# Patient Record
Sex: MALE | Race: OTHER | Employment: UNEMPLOYED | URBAN - METROPOLITAN AREA
[De-identification: names, ages, dates, MRNs, and addresses within clinical notes are randomized per-mention and may not be internally consistent; named-entity substitution may affect disease eponyms.]

---

## 2018-05-30 ENCOUNTER — HOSPITAL ENCOUNTER (EMERGENCY)
Age: 38
Discharge: HOME | End: 2018-05-30
Payer: COMMERCIAL

## 2018-05-30 VITALS
RESPIRATION RATE: 18 BRPM | WEIGHT: 230 LBS | HEIGHT: 77 IN | HEART RATE: 70 BPM | DIASTOLIC BLOOD PRESSURE: 87 MMHG | BODY MASS INDEX: 27.16 KG/M2 | OXYGEN SATURATION: 98 % | SYSTOLIC BLOOD PRESSURE: 142 MMHG | TEMPERATURE: 97.5 F

## 2018-05-30 DIAGNOSIS — I82.4Z1 ACUTE DEEP VEIN THROMBOSIS (DVT) OF DISTAL VEIN OF RIGHT LOWER EXTREMITY (HCC): Primary | ICD-10-CM

## 2018-05-30 PROCEDURE — 99282 EMERGENCY DEPT VISIT SF MDM: CPT

## 2018-06-13 ENCOUNTER — OFFICE VISIT (OUTPATIENT)
Dept: FAMILY MEDICINE CLINIC | Age: 38
End: 2018-06-13
Payer: COMMERCIAL

## 2018-06-13 VITALS
HEART RATE: 79 BPM | OXYGEN SATURATION: 95 % | SYSTOLIC BLOOD PRESSURE: 112 MMHG | BODY MASS INDEX: 28.3 KG/M2 | HEIGHT: 75 IN | DIASTOLIC BLOOD PRESSURE: 66 MMHG | WEIGHT: 227.6 LBS

## 2018-06-13 DIAGNOSIS — R53.83 FATIGUE, UNSPECIFIED TYPE: ICD-10-CM

## 2018-06-13 DIAGNOSIS — M25.561 ACUTE PAIN OF RIGHT KNEE: ICD-10-CM

## 2018-06-13 DIAGNOSIS — E78.5 DYSLIPIDEMIA: ICD-10-CM

## 2018-06-13 DIAGNOSIS — D68.9 CLOTTING DISORDER (HCC): ICD-10-CM

## 2018-06-13 DIAGNOSIS — R10.84 GENERALIZED ABDOMINAL PAIN: ICD-10-CM

## 2018-06-13 DIAGNOSIS — I82.4Z1 ACUTE DEEP VEIN THROMBOSIS (DVT) OF DISTAL VEIN OF RIGHT LOWER EXTREMITY (HCC): Primary | ICD-10-CM

## 2018-06-13 DIAGNOSIS — K75.9 HEPATITIS: ICD-10-CM

## 2018-06-13 DIAGNOSIS — R73.01 IFG (IMPAIRED FASTING GLUCOSE): ICD-10-CM

## 2018-06-13 DIAGNOSIS — R05.9 COUGH: ICD-10-CM

## 2018-06-13 PROCEDURE — G8427 DOCREV CUR MEDS BY ELIG CLIN: HCPCS | Performed by: FAMILY MEDICINE

## 2018-06-13 PROCEDURE — G8419 CALC BMI OUT NRM PARAM NOF/U: HCPCS | Performed by: FAMILY MEDICINE

## 2018-06-13 PROCEDURE — 1036F TOBACCO NON-USER: CPT | Performed by: FAMILY MEDICINE

## 2018-06-13 PROCEDURE — 99204 OFFICE O/P NEW MOD 45 MIN: CPT | Performed by: FAMILY MEDICINE

## 2018-06-13 RX ORDER — APIXABAN 5 MG/1
1 TABLET, FILM COATED ORAL 2 TIMES DAILY
Refills: 0 | COMMUNITY
Start: 2018-05-30 | End: 2018-06-27 | Stop reason: SDUPTHER

## 2018-06-13 ASSESSMENT — ENCOUNTER SYMPTOMS
ORTHOPNEA: 0
COUGH: 0
BLURRED VISION: 0
BACK PAIN: 0
STRIDOR: 0
SHORTNESS OF BREATH: 0
WHEEZING: 0
EYE DISCHARGE: 0
RESPIRATORY NEGATIVE: 1
SPUTUM PRODUCTION: 0
SORE THROAT: 0
BLOOD IN STOOL: 0
VOMITING: 0
HEARTBURN: 0
CONSTIPATION: 0
PHOTOPHOBIA: 0
DIARRHEA: 0
SINUS PAIN: 0
DOUBLE VISION: 0
EYE REDNESS: 0
HEMOPTYSIS: 0
ABDOMINAL PAIN: 0
EYE PAIN: 0
GASTROINTESTINAL NEGATIVE: 1
NAUSEA: 0
EYES NEGATIVE: 1

## 2018-06-13 ASSESSMENT — PATIENT HEALTH QUESTIONNAIRE - PHQ9
SUM OF ALL RESPONSES TO PHQ9 QUESTIONS 1 & 2: 0
2. FEELING DOWN, DEPRESSED OR HOPELESS: 0
1. LITTLE INTEREST OR PLEASURE IN DOING THINGS: 0
SUM OF ALL RESPONSES TO PHQ QUESTIONS 1-9: 0

## 2018-06-19 DIAGNOSIS — D68.9 CLOTTING DISORDER (HCC): Primary | ICD-10-CM

## 2018-06-27 RX ORDER — APIXABAN 5 MG/1
5 TABLET, FILM COATED ORAL 2 TIMES DAILY
Qty: 180 TABLET | Refills: 0 | Status: SHIPPED | OUTPATIENT
Start: 2018-06-27 | End: 2018-07-02 | Stop reason: SDUPTHER

## 2018-06-29 ENCOUNTER — OFFICE VISIT (OUTPATIENT)
Dept: ONCOLOGY | Age: 38
End: 2018-06-29
Payer: COMMERCIAL

## 2018-06-29 ENCOUNTER — HOSPITAL ENCOUNTER (OUTPATIENT)
Dept: INFUSION THERAPY | Age: 38
Discharge: HOME | End: 2018-06-29
Payer: COMMERCIAL

## 2018-06-29 VITALS
TEMPERATURE: 97.6 F | WEIGHT: 223 LBS | HEART RATE: 66 BPM | DIASTOLIC BLOOD PRESSURE: 65 MMHG | SYSTOLIC BLOOD PRESSURE: 100 MMHG | HEIGHT: 76 IN | BODY MASS INDEX: 27.16 KG/M2

## 2018-06-29 DIAGNOSIS — I82.491 ACUTE DEEP VEIN THROMBOSIS (DVT) OF OTHER SPECIFIED VEIN OF RIGHT LOWER EXTREMITY (HCC): Primary | ICD-10-CM

## 2018-06-29 DIAGNOSIS — I82.491 ACUTE DEEP VEIN THROMBOSIS (DVT) OF OTHER SPECIFIED VEIN OF RIGHT LOWER EXTREMITY (HCC): ICD-10-CM

## 2018-06-29 LAB
ALBUMIN SERPL-MCNC: 4.4 G/DL (ref 3.5–5.2)
ALP BLD-CCNC: 109 U/L (ref 40–129)
ALT SERPL-CCNC: 40 U/L (ref 0–40)
ANION GAP SERPL CALCULATED.3IONS-SCNC: 8 MMOL/L (ref 7–16)
AST SERPL-CCNC: 32 U/L (ref 0–39)
BASOPHILS ABSOLUTE: 0.03 E9/L (ref 0–0.2)
BASOPHILS RELATIVE PERCENT: 0.5 % (ref 0–2)
BILIRUB SERPL-MCNC: 1.4 MG/DL (ref 0–1.2)
BUN BLDV-MCNC: 10 MG/DL (ref 6–20)
CALCIUM SERPL-MCNC: 9.3 MG/DL (ref 8.6–10.2)
CHLORIDE BLD-SCNC: 102 MMOL/L (ref 98–107)
CO2: 27 MMOL/L (ref 22–29)
CREAT SERPL-MCNC: 1.4 MG/DL (ref 0.7–1.2)
D DIMER: <200 NG/ML DDU
EOSINOPHILS ABSOLUTE: 0.33 E9/L (ref 0.05–0.5)
EOSINOPHILS RELATIVE PERCENT: 5.2 % (ref 0–6)
FIBRINOGEN: 303 MG/DL (ref 225–540)
GFR AFRICAN AMERICAN: >60
GFR NON-AFRICAN AMERICAN: 57 ML/MIN/1.73
GLUCOSE BLD-MCNC: 87 MG/DL (ref 74–109)
HCT VFR BLD CALC: 46.8 % (ref 37–54)
HEMOGLOBIN: 16.3 G/DL (ref 12.5–16.5)
HOMOCYSTEINE: 10.8 UMOL/L (ref 0–15)
IMMATURE GRANULOCYTES #: 0.01 E9/L
IMMATURE GRANULOCYTES %: 0.2 % (ref 0–5)
INR BLD: 1.4
LYMPHOCYTES ABSOLUTE: 3.01 E9/L (ref 1.5–4)
LYMPHOCYTES RELATIVE PERCENT: 47.3 % (ref 20–42)
MCH RBC QN AUTO: 28.4 PG (ref 26–35)
MCHC RBC AUTO-ENTMCNC: 34.8 % (ref 32–34.5)
MCV RBC AUTO: 81.5 FL (ref 80–99.9)
MONOCYTES ABSOLUTE: 0.46 E9/L (ref 0.1–0.95)
MONOCYTES RELATIVE PERCENT: 7.2 % (ref 2–12)
NEUTROPHILS ABSOLUTE: 2.52 E9/L (ref 1.8–7.3)
NEUTROPHILS RELATIVE PERCENT: 39.6 % (ref 43–80)
PDW BLD-RTO: 12.5 FL (ref 11.5–15)
PLATELET # BLD: 188 E9/L (ref 130–450)
PMV BLD AUTO: 8.9 FL (ref 7–12)
POTASSIUM SERPL-SCNC: 4.5 MMOL/L (ref 3.5–5)
PROTHROMBIN TIME: 15.7 SEC (ref 9.3–12.4)
RBC # BLD: 5.74 E12/L (ref 3.8–5.8)
SODIUM BLD-SCNC: 137 MMOL/L (ref 132–146)
TOTAL PROTEIN: 8.1 G/DL (ref 6.4–8.3)
WBC # BLD: 6.4 E9/L (ref 4.5–11.5)

## 2018-06-29 PROCEDURE — 88184 FLOWCYTOMETRY/ TC 1 MARKER: CPT

## 2018-06-29 PROCEDURE — G8419 CALC BMI OUT NRM PARAM NOF/U: HCPCS | Performed by: INTERNAL MEDICINE

## 2018-06-29 PROCEDURE — 85384 FIBRINOGEN ACTIVITY: CPT

## 2018-06-29 PROCEDURE — 86147 CARDIOLIPIN ANTIBODY EA IG: CPT

## 2018-06-29 PROCEDURE — 85610 PROTHROMBIN TIME: CPT

## 2018-06-29 PROCEDURE — 85303 CLOT INHIBIT PROT C ACTIVITY: CPT

## 2018-06-29 PROCEDURE — 81270 JAK2 GENE: CPT

## 2018-06-29 PROCEDURE — 36415 COLL VENOUS BLD VENIPUNCTURE: CPT | Performed by: INTERNAL MEDICINE

## 2018-06-29 PROCEDURE — 85025 COMPLETE CBC W/AUTO DIFF WBC: CPT

## 2018-06-29 PROCEDURE — 85300 ANTITHROMBIN III ACTIVITY: CPT

## 2018-06-29 PROCEDURE — 83090 ASSAY OF HOMOCYSTEINE: CPT

## 2018-06-29 PROCEDURE — G8427 DOCREV CUR MEDS BY ELIG CLIN: HCPCS | Performed by: INTERNAL MEDICINE

## 2018-06-29 PROCEDURE — 99214 OFFICE O/P EST MOD 30 MIN: CPT | Performed by: INTERNAL MEDICINE

## 2018-06-29 PROCEDURE — 86146 BETA-2 GLYCOPROTEIN ANTIBODY: CPT

## 2018-06-29 PROCEDURE — 88185 FLOWCYTOMETRY/TC ADD-ON: CPT

## 2018-06-29 PROCEDURE — 85378 FIBRIN DEGRADE SEMIQUANT: CPT

## 2018-06-29 PROCEDURE — 85306 CLOT INHIBIT PROT S FREE: CPT

## 2018-06-29 PROCEDURE — 86038 ANTINUCLEAR ANTIBODIES: CPT

## 2018-06-29 PROCEDURE — 99244 OFF/OP CNSLTJ NEW/EST MOD 40: CPT | Performed by: INTERNAL MEDICINE

## 2018-06-29 PROCEDURE — 85613 RUSSELL VIPER VENOM DILUTED: CPT

## 2018-06-29 PROCEDURE — 80053 COMPREHEN METABOLIC PANEL: CPT

## 2018-06-30 LAB
AT-III ACTIVITY: 116 % ACTIVITY (ref 83–121)
LUPUS ANTICOAG DVVT: ABNORMAL

## 2018-07-02 ENCOUNTER — TELEPHONE (OUTPATIENT)
Dept: FAMILY MEDICINE CLINIC | Age: 38
End: 2018-07-02

## 2018-07-02 LAB
ANTI-NUCLEAR ANTIBODY (ANA): NEGATIVE
ANTICARDIOLIPIN IGA ANTIBODY: 2 APL (ref 0–11)
ANTICARDIOLIPIN IGG ANTIBODY: 4 GPL (ref 0–14)
BETA-2 GLYCOPROTEIN 1 IGG ANTIBODY: 0 SGU (ref 0–20)
BETA-2 GLYCOPROTEIN 1 IGM ANTIBODY: 4 SMU (ref 0–20)
CARDIOLIPIN AB IGM: 0 MPL (ref 0–12)
Lab: NORMAL
PROTEIN S ANTIGEN, FREE: 97 % (ref 74–147)
REPORT: NORMAL
THIS TEST SENT TO: NORMAL

## 2018-07-03 LAB — PROTEIN C FUNCTIONAL: 80 % (ref 83–168)

## 2018-07-06 LAB — JAK2 GENE MUTATION QUAL: NOT DETECTED

## 2018-07-09 ENCOUNTER — HOSPITAL ENCOUNTER (OUTPATIENT)
Age: 38
Discharge: HOME | End: 2018-07-11
Payer: COMMERCIAL

## 2018-07-09 ENCOUNTER — HOSPITAL ENCOUNTER (OUTPATIENT)
Dept: MRI IMAGING | Age: 38
Discharge: HOME | End: 2018-07-11
Payer: COMMERCIAL

## 2018-07-09 ENCOUNTER — HOSPITAL ENCOUNTER (OUTPATIENT)
Dept: CT IMAGING | Age: 38
Discharge: HOME | End: 2018-07-09
Payer: COMMERCIAL

## 2018-07-09 ENCOUNTER — HOSPITAL ENCOUNTER (OUTPATIENT)
Dept: GENERAL RADIOLOGY | Age: 38
Discharge: HOME | End: 2018-07-11
Payer: COMMERCIAL

## 2018-07-09 DIAGNOSIS — I82.4Z1 ACUTE DEEP VEIN THROMBOSIS (DVT) OF DISTAL VEIN OF RIGHT LOWER EXTREMITY (HCC): ICD-10-CM

## 2018-07-09 DIAGNOSIS — M25.561 ACUTE PAIN OF RIGHT KNEE: ICD-10-CM

## 2018-07-09 DIAGNOSIS — R05.9 COUGH: ICD-10-CM

## 2018-07-09 DIAGNOSIS — R10.84 GENERALIZED ABDOMINAL PAIN: ICD-10-CM

## 2018-07-09 PROCEDURE — 74178 CT ABD&PLV WO CNTR FLWD CNTR: CPT

## 2018-07-09 PROCEDURE — 71046 X-RAY EXAM CHEST 2 VIEWS: CPT

## 2018-07-09 PROCEDURE — 6360000004 HC RX CONTRAST MEDICATION: Performed by: RADIOLOGY

## 2018-07-09 PROCEDURE — 73721 MRI JNT OF LWR EXTRE W/O DYE: CPT

## 2018-07-09 PROCEDURE — 73562 X-RAY EXAM OF KNEE 3: CPT

## 2018-07-09 RX ADMIN — IOPAMIDOL 80 ML: 755 INJECTION, SOLUTION INTRAVENOUS at 19:43

## 2018-07-17 ENCOUNTER — OFFICE VISIT (OUTPATIENT)
Dept: FAMILY MEDICINE CLINIC | Age: 38
End: 2018-07-17
Payer: COMMERCIAL

## 2018-07-17 VITALS
OXYGEN SATURATION: 98 % | SYSTOLIC BLOOD PRESSURE: 118 MMHG | RESPIRATION RATE: 18 BRPM | WEIGHT: 225 LBS | BODY MASS INDEX: 27.4 KG/M2 | DIASTOLIC BLOOD PRESSURE: 74 MMHG | HEIGHT: 76 IN | HEART RATE: 73 BPM

## 2018-07-17 DIAGNOSIS — D68.9 CLOTTING DISORDER (HCC): ICD-10-CM

## 2018-07-17 DIAGNOSIS — I82.4Y1 ACUTE DEEP VEIN THROMBOSIS (DVT) OF PROXIMAL VEIN OF RIGHT LOWER EXTREMITY (HCC): Primary | ICD-10-CM

## 2018-07-17 PROCEDURE — 1036F TOBACCO NON-USER: CPT | Performed by: FAMILY MEDICINE

## 2018-07-17 PROCEDURE — G8419 CALC BMI OUT NRM PARAM NOF/U: HCPCS | Performed by: FAMILY MEDICINE

## 2018-07-17 PROCEDURE — 99213 OFFICE O/P EST LOW 20 MIN: CPT | Performed by: FAMILY MEDICINE

## 2018-07-17 PROCEDURE — G8427 DOCREV CUR MEDS BY ELIG CLIN: HCPCS | Performed by: FAMILY MEDICINE

## 2018-07-17 ASSESSMENT — ENCOUNTER SYMPTOMS
DOUBLE VISION: 0
STRIDOR: 0
SORE THROAT: 0
CONSTIPATION: 0
EYE DISCHARGE: 0
ABDOMINAL PAIN: 0
EYE PAIN: 0
BACK PAIN: 0
COUGH: 0
GASTROINTESTINAL NEGATIVE: 1
WHEEZING: 0
BLURRED VISION: 0
NAUSEA: 0
PHOTOPHOBIA: 0
EYES NEGATIVE: 1
BLOOD IN STOOL: 0
SINUS PAIN: 0
EYE REDNESS: 0
HEMOPTYSIS: 0
VOMITING: 0
HEARTBURN: 0
ORTHOPNEA: 0
DIARRHEA: 0
SPUTUM PRODUCTION: 0
SHORTNESS OF BREATH: 0
RESPIRATORY NEGATIVE: 1

## 2018-07-17 ASSESSMENT — PATIENT HEALTH QUESTIONNAIRE - PHQ9
2. FEELING DOWN, DEPRESSED OR HOPELESS: 0
SUM OF ALL RESPONSES TO PHQ9 QUESTIONS 1 & 2: 0
1. LITTLE INTEREST OR PLEASURE IN DOING THINGS: 0
SUM OF ALL RESPONSES TO PHQ QUESTIONS 1-9: 0

## 2018-07-17 NOTE — PATIENT INSTRUCTIONS
exercise to do when you are sitting for long periods of time. · Get out of bed as soon as you can after an illness or surgery. If you need to stay in bed, do the leg exercise noted above every hour when you are awake. · Use special stockings called compression stockings. These stockings are tight at the feet with a gradually looser fit on the leg. Many doctors recommend that you wear compression stockings during a journey longer than 8 hours. · Take breaks when you are on long trips. Stop the car and walk around. On long airplane flights, walk up and down the aisle hourly, flex and point your feet every 20 minutes while sitting, and drink plenty of water. · Take blood-thinning medicines after some types of surgery if your doctor recommends it. Blood thinners also may be used if you are likely to develop clots. How is DVT treated? Treatment for DVT usually involves taking blood thinners. These medicines are given through a vein (intravenously, or IV) or as a pill. Talk with your doctor about which medicine is right for you. Your doctor also may suggest that you prop up or elevate your leg when possible, take walks, and wear compression stockings. These measures may help reduce the pain and swelling that can happen with DVT. Follow-up care is a key part of your treatment and safety. Be sure to make and go to all appointments, and call your doctor if you are having problems. It's also a good idea to know your test results and keep a list of the medicines you take. Where can you learn more? Go to https://[email or web address].[email or web address] org and sign in to your [employer] account. Enter E964 in the [facility] box to learn more about \"Learning About Deep Vein Thrombosis. \"     If you do not have an account, please click on the \"Sign Up Now\" link. Current as of: November 21, 2017  Content Version: 11.6  © 0268-0503 [employer]. Care instructions adapted under license by [facility] ([facility]).  If you have questions about a medical condition or this instruction, always ask your healthcare professional. [address] any warranty or liability for your use of this information. Patient Education        Learning About How to Prevent Blood Clots  What is a blood clot? A blood clot is a clump of blood that forms in a blood vessel, such as a vein or an artery. If a clot gets stuck in a blood vessel, it can cause serious problems like a deep vein thrombosis (DVT) or a pulmonary embolism. A DVT is a blood clot in certain veins of the legs, pelvis, or arms. It most often occurs in the legs. Blood clots in these veins need to be treated, because they can get bigger, break loose, and travel through the bloodstream to the heart and then to the lungs. This causes a pulmonary embolism. A pulmonary embolism is a sudden blockage of an artery in the lung. Blood clots in the deep veins of the leg are the most common cause of a pulmonary embolism. In many cases, the clots are small. They may damage the lung. But if the clot is large and stops blood flow to the lung, it can be deadly. What increases your risk for blood clots? Some of the things that can increase your risk for a blood clot include:  Slowed blood flow  When blood doesn't flow normally, clots are more likely to develop. Reduced blood flow may result from long-term bed rest, such as after a surgery, injury, or serious illness. Or it may result from sitting for a long time, especially when traveling long distances. Abnormal clotting  Some people have blood that clots too easily or too quickly. Problems that may cause increased clotting include:  · Having certain blood problems that make blood clot too easily. This is a problem that may run in families. · Having certain health problems, such as cancer, heart failure, stroke, or severe infection. · Being pregnant.  A woman's risk of getting blood clots increases both during pregnancy and shortly after delivery or after a  section. · Using hormonal forms of birth control or hormone therapy. · Smoking. Injury to the blood vessel wall  Blood is more likely to clot in veins and arteries shortly after they are injured. Injury can be caused by a recent medical procedure or surgery that involved your legs, hips, belly, or brain. Or it can be caused by an injury, such as a broken hip. What can you do to prevent blood clots? After any procedure or event that increases your risk  · Take a blood-thinning medicine (called an anticoagulant) as directed if your doctor prescribes one. · Exercise your lower leg muscles to help keep the blood moving through your legs. Point your toes up toward your head so the calves of your legs are stretched, then relax. Repeat. This is a good exercise to do when you are sitting for long periods of time. · Get up out of bed as soon as you safely can or as soon as your doctor says it's okay after an illness or surgery. If you can't get out of bed, you can do the leg exercise described above. Try to do this leg exercise every hour when you are awake. This will help keep the blood moving through your legs. If you are in the hospital and need to stay in bed, your doctor may have you use a special device that inflates and deflates knee-high boots to help keep blood from pooling in your legs. · Use compression stockings if your doctor prescribes them. These are specially fitted stockings that may prevent blood clots by keeping blood from pooling in your legs. When you travel  · Take breaks when you travel. On long car trips, stop the car and walk around every hour or so. On long bus or train rides or plane flights, get out of your seat and walk up and down the aisle every hour or so. · Do leg exercises while you are seated. For example, pump your feet up and down by pulling your toes up toward your knees and then pointing them down.   If you already have a risk of blood

## 2018-07-17 NOTE — PROGRESS NOTES
melena, nausea and vomiting. Genitourinary: Negative. Negative for dysuria, flank pain, frequency, hematuria and urgency. Musculoskeletal: Positive for joint pain. Negative for back pain, falls, myalgias and neck pain. Pain right lower leg and ankle  Pain to his right knee  R/O baker's cyst    Skin: Negative. Negative for itching and rash. Neurological: Negative. Negative for dizziness, tingling, tremors, sensory change, speech change, focal weakness, seizures, loss of consciousness, weakness and headaches. Endo/Heme/Allergies: Negative. Negative for environmental allergies and polydipsia. Does not bruise/bleed easily. Psychiatric/Behavioral: Negative. Negative for depression, hallucinations, memory loss, substance abuse and suicidal ideas. The patient is not nervous/anxious and does not have insomnia. Past Medical/Surgical Hx;  Reviewed with patient      Diagnosis Date    Arthritis     Hx of blood clots      History reviewed. No pertinent surgical history. Past Family Hx:  Reviewed with patient  Family History   Problem Relation Age of Onset    No Known Problems Mother     No Known Problems Father     No Known Problems Sister     Kidney Disease Brother     High Cholesterol Brother        Social Hx:  Reviewed with patient  Social History   Substance Use Topics    Smoking status: Never Smoker    Smokeless tobacco: Never Used    Alcohol use No       OBJECTIVE  /74   Pulse 73   Resp 18   Ht 6' 4\" (1.93 m)   Wt 225 lb (102.1 kg)   SpO2 98%   BMI 27.39 kg/m²     Problem List:  Luis Garcia  does not have any pertinent problems on file. PHYS EX:  Physical Exam   Constitutional: He is oriented to person, place, and time. He appears well-developed and well-nourished. No distress. HENT:   Head: Normocephalic and atraumatic.    Right Ear: External ear normal.   Left Ear: External ear normal.   Nose: Nose normal.   Mouth/Throat: Oropharynx is clear and moist. No oropharyngeal exudate. Eyes: Conjunctivae and EOM are normal. Pupils are equal, round, and reactive to light. Right eye exhibits no discharge. Left eye exhibits no discharge. No scleral icterus. Neck: Normal range of motion. Neck supple. No JVD present. No tracheal deviation present. No thyromegaly present. Cardiovascular: Normal rate, regular rhythm and normal heart sounds. Exam reveals no gallop and no friction rub. No murmur heard. Pulmonary/Chest: Effort normal and breath sounds normal. No stridor. No respiratory distress. He has no wheezes. He has no rales. He exhibits no tenderness. Abdominal: Soft. Bowel sounds are normal. He exhibits no distension and no mass. There is no tenderness. There is no rebound and no guarding. No hernia. Musculoskeletal: Normal range of motion. He exhibits edema. He exhibits no tenderness or deformity. Edema and pain to the right lower leg due to DVT  Right knee pain  R/O baker's cyst    Lymphadenopathy:     He has no cervical adenopathy. Neurological: He is alert and oriented to person, place, and time. He has normal reflexes. He displays normal reflexes. No cranial nerve deficit or sensory deficit. He exhibits normal muscle tone. Coordination normal.   Skin: Skin is warm. No rash noted. He is not diaphoretic. No erythema. No pallor. Nursing note and vitals reviewed. ASSESSMENT/PLAN  Bennie Capone was seen today for results and health maintenance. Diagnoses and all orders for this visit:    Acute deep vein thrombosis (DVT) of proximal vein of right lower extremity ([address]  --doing well on Eliquis     Clotting disorder [facility])  --follows with hematology 7 /30/2018        Outpatient Encounter Prescriptions as of 7/17/2018   Medication Sig Dispense Refill    apixaban (ELIQUIS) 5 MG TABS tablet Take 1 tablet by mouth 2 times daily 180 tablet 1     No facility-administered encounter medications on file as of 7/17/2018.         No Follow-up on file.        Reviewed recent labs related to Chidirobert's current problems      Discussed importance of regular Health Maintenance follow up  Health Maintenance   Topic    HIV screen     DTaP/Tdap/Td vaccine (1 - Tdap)    Flu vaccine (1)

## 2018-07-27 ENCOUNTER — OFFICE VISIT (OUTPATIENT)
Dept: ONCOLOGY | Age: 38
End: 2018-07-27
Payer: COMMERCIAL

## 2018-07-27 ENCOUNTER — HOSPITAL ENCOUNTER (OUTPATIENT)
Dept: INFUSION THERAPY | Age: 38
Discharge: HOME | End: 2018-07-27
Payer: COMMERCIAL

## 2018-07-27 VITALS
WEIGHT: 225.9 LBS | BODY MASS INDEX: 27.51 KG/M2 | TEMPERATURE: 97.2 F | DIASTOLIC BLOOD PRESSURE: 61 MMHG | HEIGHT: 76 IN | SYSTOLIC BLOOD PRESSURE: 108 MMHG | HEART RATE: 67 BPM

## 2018-07-27 DIAGNOSIS — I82.4Y1 ACUTE DEEP VEIN THROMBOSIS (DVT) OF PROXIMAL VEIN OF RIGHT LOWER EXTREMITY (HCC): ICD-10-CM

## 2018-07-27 DIAGNOSIS — I82.4Y1 ACUTE DEEP VEIN THROMBOSIS (DVT) OF PROXIMAL VEIN OF RIGHT LOWER EXTREMITY (HCC): Primary | ICD-10-CM

## 2018-07-27 LAB — APTT: 36.1 SEC (ref 24.5–35.1)

## 2018-07-27 PROCEDURE — 99212 OFFICE O/P EST SF 10 MIN: CPT

## 2018-07-27 PROCEDURE — 99214 OFFICE O/P EST MOD 30 MIN: CPT | Performed by: INTERNAL MEDICINE

## 2018-07-27 PROCEDURE — G8419 CALC BMI OUT NRM PARAM NOF/U: HCPCS | Performed by: INTERNAL MEDICINE

## 2018-07-27 PROCEDURE — 85730 THROMBOPLASTIN TIME PARTIAL: CPT

## 2018-07-27 PROCEDURE — 36415 COLL VENOUS BLD VENIPUNCTURE: CPT

## 2018-07-27 PROCEDURE — 81400 MOPATH PROCEDURE LEVEL 1: CPT

## 2018-07-27 PROCEDURE — G8427 DOCREV CUR MEDS BY ELIG CLIN: HCPCS | Performed by: INTERNAL MEDICINE

## 2018-07-27 PROCEDURE — 85303 CLOT INHIBIT PROT C ACTIVITY: CPT

## 2018-07-27 PROCEDURE — 81291 MTHFR GENE: CPT

## 2018-07-27 PROCEDURE — 81241 F5 GENE: CPT

## 2018-07-27 PROCEDURE — 81240 F2 GENE: CPT

## 2018-07-27 PROCEDURE — 85613 RUSSELL VIPER VENOM DILUTED: CPT

## 2018-07-27 PROCEDURE — 1036F TOBACCO NON-USER: CPT | Performed by: INTERNAL MEDICINE

## 2018-07-27 PROCEDURE — 81342 TRG GENE REARRANGEMENT ANAL: CPT

## 2018-07-27 NOTE — PROGRESS NOTES
[place] [address]  Attending Clinic Note    Reason for Visit: Follow-up on a patient with DVT    PCP:  Luciano Wright DO    History of Present Illness:  39 y/o male who was at [facility] 05/29/2018 for RLE DVT. He was given Lovenox injections and then was started on Eliquis. He denied any shortness of breath. Denied any personal history of prior blood clots or family history of clotting disorders. He was doing a lot of traveling recently. No history of malignancy. Review of Systems;  CONSTITUTIONAL: No fever, chills. Fair appetite and energy level. ENMT: Eyes: No diplopia; Nose: No epistaxis. Mouth: No sore throat. RESPIRATORY: No hemoptysis, shortness of breath. CARDIOVASCULAR: No chest pain, palpitations. GASTROINTESTINAL: No nausea/vomiting, abdominal pain, diarrhea/constipation. GENITOURINARY: No dysuria, urinary frequency, hematuria. NEURO: No syncope, presyncope, headache. Remainder:  ROS NEGATIVE    Past Medical History:      Diagnosis Date    Arthritis     Hx of blood clots      Medications:  Reviewed and reconciled. Allergies:  No Known Allergies    Physical Exam:  /61 (Site: Left Arm, Position: Sitting, Cuff Size: Medium Adult)   Pulse 67   Temp 97.2 °F (36.2 °C) (Temporal)   Ht 6' 4\" (1.93 m)   Wt 225 lb 14.4 oz (102.5 kg)   BMI 27.50 kg/m²   GENERAL: Alert, oriented x 3, not in acute distress. HEENT: PERRLA; EOMI. Oropharynx clear. NECK: Supple. Without lymphadenopathy. LUNGS: Good air entry bilaterally. No wheezing, crackles or ronchi. CARDIOVASCULAR: Regular rate. No murmurs, rubs or gallops. ABDOMEN: Soft. Non-tender, non-distended. Positive bowel sounds. EXTREMITIES: Without clubbing, cyanosis. RLE 1+ edema. NEUROLOGIC: No focal deficits. Impression/Plan:  39 y/o male with Acute RLE DVT (DVT from the mid femoral vein into the calf), diagnosed in 05/29/2018 at [facility]; He denied any shortness of breath.  Denied any personal history of prior blood clots or family history of clotting disorders. He was doing a lot of traveling recently. No history of cancer. On Eliquis 5 mg po bid with fair tolerance so far. On 06/29/2018  CMP normal except for Creat 1.4 Tbil 1.4  Serum JAK2 mutation Not detected  Peripheral blood flow cytometry noted no immunophenotypic evidence of acute leukemia or a B-cell neoplasm. Population of cells approximately 23% of all cells analyzed phenotypically consistent with T large granular lymphocytes. TCR Gene rearrangement ordered. Serum homocysteine 10.8 (0.0 - 15.0)  Anti-Thrombin  (%)  Protein S Ag, Free 97 (%)  Protein C functional 80  (%)  PILLO Negative  Anti-cardiolipin Ab Negative  Beta-2 Glycoprotein Ab Negative  DTVVT weak positive  Fibrinogen 303  PT 15.7   INR 1.4  D-dimer <200  CBC unremarkable. PTT pending. Thrombophilia panel    RTC 4 weeks to review PTT, Thrombophilia panel, Protein C and TCR Gene Rearrangement. Continue Eliquis in the interim.      Shiva Espinal MD   [birth year]75  Board Certified Medical Oncologist   Board Certified Internal Medicine

## 2018-07-28 LAB — LUPUS ANTICOAG DVVT: NEGATIVE

## 2018-07-30 LAB — PROTEIN C FUNCTIONAL: 91 % (ref 83–168)

## 2018-08-03 LAB — THROMBOPHILIA DNA ASSAY: NORMAL

## 2018-08-06 LAB
Lab: NORMAL
REPORT: NORMAL
THIS TEST SENT TO: NORMAL

## 2018-08-13 NOTE — TELEPHONE ENCOUNTER
Pt contacted the office regarding refill of eliquis. MA spoke with pt's mother and informed her that pt's refill was sent in on 7/2/18 and to contacted the pharmacy.     Electronically signed by Avery Chambers on 8/13/18 at 2:18 PM

## 2018-08-20 ENCOUNTER — TELEPHONE (OUTPATIENT)
Dept: FAMILY MEDICINE CLINIC | Age: 38
End: 2018-08-20

## 2018-08-20 NOTE — TELEPHONE ENCOUNTER
Pt left message for [address] [place] [address] stating that he has requested a refill on his Eliquis 5mg x3 to [address]. Pt was very irritated on voicemail and goes on to state that he will not be calling again, but will come to the office if it is not handled. MA reviewed pt's chart and noted that the Rx was refilled on 07/02/2018 to [address]. MA contacted [facility] and spoke with pharmacy team member who verified that the Rx was received, and that there is no co-pay with pt's insurance. Pt just needs to go to the pharmacy to  the medication. MA attempted to return phone call to pt to inform him of the above information. MA left extensive detailed message for pt informing him of the medication being available at [address], and that it has been there since July 2. MA advised pt that if there was any further issues or concerns for pt to please contact the office.     Electronically signed by Alen Whipple MA on 8/20/18 at 3:32 PM

## 2019-09-10 ENCOUNTER — HOSPITAL ENCOUNTER (OUTPATIENT)
Age: 39
Discharge: HOME | End: 2019-09-12
Payer: COMMERCIAL

## 2019-09-10 ENCOUNTER — OFFICE VISIT (OUTPATIENT)
Dept: FAMILY MEDICINE CLINIC | Age: 39
End: 2019-09-10
Payer: COMMERCIAL

## 2019-09-10 VITALS
SYSTOLIC BLOOD PRESSURE: 124 MMHG | TEMPERATURE: 97.6 F | BODY MASS INDEX: 29.98 KG/M2 | RESPIRATION RATE: 18 BRPM | DIASTOLIC BLOOD PRESSURE: 70 MMHG | HEART RATE: 69 BPM | HEIGHT: 76 IN | OXYGEN SATURATION: 96 % | WEIGHT: 246.2 LBS

## 2019-09-10 DIAGNOSIS — E66.3 OVERWEIGHT (BMI 25.0-29.9): ICD-10-CM

## 2019-09-10 DIAGNOSIS — D68.9 CLOTTING DISORDER (HCC): ICD-10-CM

## 2019-09-10 DIAGNOSIS — Z86.718 HISTORY OF DVT (DEEP VEIN THROMBOSIS): ICD-10-CM

## 2019-09-10 DIAGNOSIS — Z86.718 HISTORY OF DVT (DEEP VEIN THROMBOSIS): Primary | ICD-10-CM

## 2019-09-10 DIAGNOSIS — S93.499S SPRAIN OF OTHER LIGAMENT OF ANKLE, UNSPECIFIED LATERALITY, SEQUELA: ICD-10-CM

## 2019-09-10 PROBLEM — S93.409A SPRAIN OF ANKLE: Status: ACTIVE | Noted: 2019-09-10

## 2019-09-10 LAB
ALBUMIN SERPL-MCNC: 4.3 G/DL (ref 3.5–5.2)
ALP BLD-CCNC: 125 U/L (ref 40–129)
ALT SERPL-CCNC: 32 U/L (ref 0–40)
ANION GAP SERPL CALCULATED.3IONS-SCNC: 12 MMOL/L (ref 7–16)
AST SERPL-CCNC: 27 U/L (ref 0–39)
BILIRUB SERPL-MCNC: 0.7 MG/DL (ref 0–1.2)
BUN BLDV-MCNC: 13 MG/DL (ref 6–20)
CALCIUM SERPL-MCNC: 9.6 MG/DL (ref 8.6–10.2)
CHLORIDE BLD-SCNC: 102 MMOL/L (ref 98–107)
CHOLESTEROL, TOTAL: 194 MG/DL (ref 0–199)
CO2: 25 MMOL/L (ref 22–29)
CREAT SERPL-MCNC: 1.3 MG/DL (ref 0.7–1.2)
GFR AFRICAN AMERICAN: >60
GFR NON-AFRICAN AMERICAN: >60 ML/MIN/1.73
GLUCOSE BLD-MCNC: 98 MG/DL (ref 74–99)
HCT VFR BLD CALC: 48 % (ref 37–54)
HDLC SERPL-MCNC: 33 MG/DL
HEMOGLOBIN: 15.9 G/DL (ref 12.5–16.5)
LDL CHOLESTEROL CALCULATED: 138 MG/DL (ref 0–99)
MCH RBC QN AUTO: 28.6 PG (ref 26–35)
MCHC RBC AUTO-ENTMCNC: 33.1 % (ref 32–34.5)
MCV RBC AUTO: 86.5 FL (ref 80–99.9)
PDW BLD-RTO: 13.1 FL (ref 11.5–15)
PLATELET # BLD: 212 E9/L (ref 130–450)
PMV BLD AUTO: 9.6 FL (ref 7–12)
POTASSIUM SERPL-SCNC: 4.8 MMOL/L (ref 3.5–5)
RBC # BLD: 5.55 E12/L (ref 3.8–5.8)
SODIUM BLD-SCNC: 139 MMOL/L (ref 132–146)
TOTAL PROTEIN: 8.3 G/DL (ref 6.4–8.3)
TRIGL SERPL-MCNC: 117 MG/DL (ref 0–149)
TSH SERPL DL<=0.05 MIU/L-ACNC: 4.11 UIU/ML (ref 0.27–4.2)
VITAMIN D 25-HYDROXY: 36 NG/ML (ref 30–100)
VLDLC SERPL CALC-MCNC: 23 MG/DL
WBC # BLD: 6.7 E9/L (ref 4.5–11.5)

## 2019-09-10 PROCEDURE — 99214 OFFICE O/P EST MOD 30 MIN: CPT | Performed by: FAMILY MEDICINE

## 2019-09-10 PROCEDURE — 1036F TOBACCO NON-USER: CPT | Performed by: FAMILY MEDICINE

## 2019-09-10 PROCEDURE — 82306 VITAMIN D 25 HYDROXY: CPT

## 2019-09-10 PROCEDURE — G8427 DOCREV CUR MEDS BY ELIG CLIN: HCPCS | Performed by: FAMILY MEDICINE

## 2019-09-10 PROCEDURE — G8419 CALC BMI OUT NRM PARAM NOF/U: HCPCS | Performed by: FAMILY MEDICINE

## 2019-09-10 PROCEDURE — 80061 LIPID PANEL: CPT

## 2019-09-10 PROCEDURE — 84443 ASSAY THYROID STIM HORMONE: CPT

## 2019-09-10 PROCEDURE — 80053 COMPREHEN METABOLIC PANEL: CPT

## 2019-09-10 PROCEDURE — 85027 COMPLETE CBC AUTOMATED: CPT

## 2019-09-10 ASSESSMENT — PATIENT HEALTH QUESTIONNAIRE - PHQ9
SUM OF ALL RESPONSES TO PHQ QUESTIONS 1-9: 0
1. LITTLE INTEREST OR PLEASURE IN DOING THINGS: 0
SUM OF ALL RESPONSES TO PHQ9 QUESTIONS 1 & 2: 0
2. FEELING DOWN, DEPRESSED OR HOPELESS: 0
SUM OF ALL RESPONSES TO PHQ QUESTIONS 1-9: 0

## 2019-09-10 ASSESSMENT — ENCOUNTER SYMPTOMS
NAUSEA: 0
VOMITING: 0
CONSTIPATION: 0
SHORTNESS OF BREATH: 0
WHEEZING: 0
DIARRHEA: 0
ABDOMINAL PAIN: 0
BACK PAIN: 0
COUGH: 0
COLOR CHANGE: 0

## 2019-09-10 NOTE — PROGRESS NOTES
[facility])  Family Medicine Outpatient        SUBJECTIVE:  CC: had concerns including Leg Pain (Pt here today to f/u on blood clot in R leg). Erick Chua presented to the clinic for a routine visit. Macy Novak is presenting to the office today for an established visit. He has a history of a unprovoked blood clot in his right leg and has a history of a clotting disorder. He is on Eliquis and tolerating it well. He reports he is also seeing podiatry for right ankle swelling and reports that he was found to have a ligament tear. He is presently being treated conservatively with an ankle brace. He denies any acute concerns. Review of Systems   Constitutional: Negative for appetite change, fatigue and fever. Respiratory: Negative for cough, shortness of breath and wheezing. Cardiovascular: Positive for leg swelling (R>L chronic). Negative for chest pain and palpitations. Gastrointestinal: Negative for abdominal pain, constipation, diarrhea, nausea and vomiting. Musculoskeletal: Negative for arthralgias and back pain. Skin: Negative for color change, pallor and wound. Outpatient Medications Marked as Taking for the 9/10/19 encounter (Office Visit) with Tiffany Thao MD   Medication Sig Dispense Refill    apixaban (ELIQUIS) 5 MG TABS tablet Take 1 tablet by mouth 2 times daily 180 tablet 1       I have reviewed all pertinent PMHx, PSHx, FamHx, SocialHx, medications, and allergies and updated history as appropriate. OBJECTIVE    VS: /70   Pulse 69   Temp 97.6 °F (36.4 °C)   Resp 18   Ht 6' 3.98\" (1.93 m)   Wt 246 lb 3.2 oz (111.7 kg)   SpO2 96%   BMI 29.98 kg/m²   Physical Exam   Constitutional: He is oriented to person, place, and time. He appears well-developed and well-nourished. No distress. HENT:   Head: Normocephalic and atraumatic. Eyes: Pupils are equal, round, and reactive to light. EOM are normal.   Cardiovascular: Normal rate and regular rhythm.

## 2019-09-24 ENCOUNTER — OFFICE VISIT (OUTPATIENT)
Dept: ONCOLOGY | Age: 39
End: 2019-09-24
Payer: COMMERCIAL

## 2019-09-24 ENCOUNTER — HOSPITAL ENCOUNTER (OUTPATIENT)
Dept: INFUSION THERAPY | Age: 39
Discharge: HOME | End: 2019-09-24
Payer: COMMERCIAL

## 2019-09-24 VITALS
TEMPERATURE: 96.9 F | BODY MASS INDEX: 30.16 KG/M2 | DIASTOLIC BLOOD PRESSURE: 78 MMHG | OXYGEN SATURATION: 95 % | HEIGHT: 76 IN | HEART RATE: 71 BPM | SYSTOLIC BLOOD PRESSURE: 138 MMHG | WEIGHT: 247.7 LBS

## 2019-09-24 DIAGNOSIS — I82.4Y1 ACUTE DEEP VEIN THROMBOSIS (DVT) OF PROXIMAL VEIN OF RIGHT LOWER EXTREMITY (HCC): ICD-10-CM

## 2019-09-24 DIAGNOSIS — Z09 FOLLOW UP: Primary | ICD-10-CM

## 2019-09-24 DIAGNOSIS — D68.9 CLOTTING DISORDER (HCC): ICD-10-CM

## 2019-09-24 DIAGNOSIS — I82.4Y1 ACUTE DEEP VEIN THROMBOSIS (DVT) OF PROXIMAL VEIN OF RIGHT LOWER EXTREMITY (HCC): Primary | ICD-10-CM

## 2019-09-24 LAB — D DIMER: <200 NG/ML DDU

## 2019-09-24 PROCEDURE — 99214 OFFICE O/P EST MOD 30 MIN: CPT | Performed by: INTERNAL MEDICINE

## 2019-09-24 PROCEDURE — G8417 CALC BMI ABV UP PARAM F/U: HCPCS | Performed by: INTERNAL MEDICINE

## 2019-09-24 PROCEDURE — 99212 OFFICE O/P EST SF 10 MIN: CPT

## 2019-09-24 PROCEDURE — G8428 CUR MEDS NOT DOCUMENT: HCPCS | Performed by: INTERNAL MEDICINE

## 2019-09-24 PROCEDURE — 85378 FIBRIN DEGRADE SEMIQUANT: CPT

## 2019-09-24 PROCEDURE — 36415 COLL VENOUS BLD VENIPUNCTURE: CPT

## 2019-09-24 PROCEDURE — 1036F TOBACCO NON-USER: CPT | Performed by: INTERNAL MEDICINE

## 2019-09-27 ENCOUNTER — HOSPITAL ENCOUNTER (OUTPATIENT)
Dept: INTERVENTIONAL RADIOLOGY/VASCULAR | Age: 39
Discharge: HOME | End: 2019-09-29
Payer: COMMERCIAL

## 2019-09-27 DIAGNOSIS — Z86.718 HISTORY OF DVT (DEEP VEIN THROMBOSIS): ICD-10-CM

## 2019-09-27 PROCEDURE — 93971 EXTREMITY STUDY: CPT

## 2019-10-08 ENCOUNTER — HOSPITAL ENCOUNTER (OUTPATIENT)
Dept: INFUSION THERAPY | Age: 39
Discharge: HOME | End: 2019-10-08
Payer: COMMERCIAL

## 2019-10-08 ENCOUNTER — OFFICE VISIT (OUTPATIENT)
Dept: ONCOLOGY | Age: 39
End: 2019-10-08
Payer: COMMERCIAL

## 2019-10-08 VITALS
TEMPERATURE: 97 F | HEART RATE: 64 BPM | BODY MASS INDEX: 30.41 KG/M2 | WEIGHT: 249.7 LBS | SYSTOLIC BLOOD PRESSURE: 110 MMHG | HEIGHT: 76 IN | DIASTOLIC BLOOD PRESSURE: 66 MMHG | OXYGEN SATURATION: 98 %

## 2019-10-08 DIAGNOSIS — I82.4Y1 ACUTE DEEP VEIN THROMBOSIS (DVT) OF PROXIMAL VEIN OF RIGHT LOWER EXTREMITY (HCC): Primary | ICD-10-CM

## 2019-10-08 PROCEDURE — 99213 OFFICE O/P EST LOW 20 MIN: CPT

## 2019-10-08 PROCEDURE — 1036F TOBACCO NON-USER: CPT | Performed by: INTERNAL MEDICINE

## 2019-10-08 PROCEDURE — G8427 DOCREV CUR MEDS BY ELIG CLIN: HCPCS | Performed by: INTERNAL MEDICINE

## 2019-10-08 PROCEDURE — G8417 CALC BMI ABV UP PARAM F/U: HCPCS | Performed by: INTERNAL MEDICINE

## 2019-10-08 PROCEDURE — G8484 FLU IMMUNIZE NO ADMIN: HCPCS | Performed by: INTERNAL MEDICINE

## 2019-10-08 PROCEDURE — 99214 OFFICE O/P EST MOD 30 MIN: CPT | Performed by: INTERNAL MEDICINE

## 2020-01-03 ENCOUNTER — HOSPITAL ENCOUNTER (OUTPATIENT)
Dept: ULTRASOUND IMAGING | Age: 40
Discharge: HOME | End: 2020-01-03
Payer: COMMERCIAL

## 2020-01-03 PROCEDURE — 93971 EXTREMITY STUDY: CPT

## 2020-01-10 ENCOUNTER — OFFICE VISIT (OUTPATIENT)
Dept: ONCOLOGY | Age: 40
End: 2020-01-10
Payer: COMMERCIAL

## 2020-01-10 ENCOUNTER — HOSPITAL ENCOUNTER (OUTPATIENT)
Dept: INFUSION THERAPY | Age: 40
Discharge: HOME | End: 2020-01-10
Payer: COMMERCIAL

## 2020-01-10 VITALS
SYSTOLIC BLOOD PRESSURE: 110 MMHG | WEIGHT: 259 LBS | HEART RATE: 65 BPM | HEIGHT: 76 IN | DIASTOLIC BLOOD PRESSURE: 72 MMHG | BODY MASS INDEX: 31.54 KG/M2 | TEMPERATURE: 98.2 F

## 2020-01-10 LAB
ALBUMIN SERPL-MCNC: 4 G/DL (ref 3.5–5.2)
ALP BLD-CCNC: 119 U/L (ref 40–129)
ALT SERPL-CCNC: 19 U/L (ref 0–40)
ANION GAP SERPL CALCULATED.3IONS-SCNC: 11 MMOL/L (ref 7–16)
AST SERPL-CCNC: 21 U/L (ref 0–39)
BASOPHILS ABSOLUTE: 0.03 E9/L (ref 0–0.2)
BASOPHILS RELATIVE PERCENT: 0.5 % (ref 0–2)
BILIRUB SERPL-MCNC: 0.6 MG/DL (ref 0–1.2)
BUN BLDV-MCNC: 15 MG/DL (ref 6–20)
CALCIUM SERPL-MCNC: 9 MG/DL (ref 8.6–10.2)
CHLORIDE BLD-SCNC: 106 MMOL/L (ref 98–107)
CO2: 22 MMOL/L (ref 22–29)
CREAT SERPL-MCNC: 1.3 MG/DL (ref 0.7–1.2)
D DIMER: <200 NG/ML DDU
EOSINOPHILS ABSOLUTE: 0.43 E9/L (ref 0.05–0.5)
EOSINOPHILS RELATIVE PERCENT: 6.7 % (ref 0–6)
GFR AFRICAN AMERICAN: >60
GFR NON-AFRICAN AMERICAN: >60 ML/MIN/1.73
GLUCOSE BLD-MCNC: 126 MG/DL (ref 74–99)
HCT VFR BLD CALC: 44.6 % (ref 37–54)
HEMOGLOBIN: 15.2 G/DL (ref 12.5–16.5)
IMMATURE GRANULOCYTES #: 0.02 E9/L
IMMATURE GRANULOCYTES %: 0.3 % (ref 0–5)
LYMPHOCYTES ABSOLUTE: 2.69 E9/L (ref 1.5–4)
LYMPHOCYTES RELATIVE PERCENT: 41.8 % (ref 20–42)
MCH RBC QN AUTO: 28.5 PG (ref 26–35)
MCHC RBC AUTO-ENTMCNC: 34.1 % (ref 32–34.5)
MCV RBC AUTO: 83.5 FL (ref 80–99.9)
MONOCYTES ABSOLUTE: 0.42 E9/L (ref 0.1–0.95)
MONOCYTES RELATIVE PERCENT: 6.5 % (ref 2–12)
NEUTROPHILS ABSOLUTE: 2.84 E9/L (ref 1.8–7.3)
NEUTROPHILS RELATIVE PERCENT: 44.2 % (ref 43–80)
PDW BLD-RTO: 12.3 FL (ref 11.5–15)
PLATELET # BLD: 188 E9/L (ref 130–450)
PMV BLD AUTO: 9.1 FL (ref 7–12)
POTASSIUM SERPL-SCNC: 4.3 MMOL/L (ref 3.5–5)
RBC # BLD: 5.34 E12/L (ref 3.8–5.8)
SODIUM BLD-SCNC: 139 MMOL/L (ref 132–146)
TOTAL PROTEIN: 7.4 G/DL (ref 6.4–8.3)
WBC # BLD: 6.4 E9/L (ref 4.5–11.5)

## 2020-01-10 PROCEDURE — 85025 COMPLETE CBC W/AUTO DIFF WBC: CPT

## 2020-01-10 PROCEDURE — 99214 OFFICE O/P EST MOD 30 MIN: CPT | Performed by: INTERNAL MEDICINE

## 2020-01-10 PROCEDURE — G8427 DOCREV CUR MEDS BY ELIG CLIN: HCPCS | Performed by: INTERNAL MEDICINE

## 2020-01-10 PROCEDURE — 36415 COLL VENOUS BLD VENIPUNCTURE: CPT

## 2020-01-10 PROCEDURE — 80053 COMPREHEN METABOLIC PANEL: CPT

## 2020-01-10 PROCEDURE — G8484 FLU IMMUNIZE NO ADMIN: HCPCS | Performed by: INTERNAL MEDICINE

## 2020-01-10 PROCEDURE — G8417 CALC BMI ABV UP PARAM F/U: HCPCS | Performed by: INTERNAL MEDICINE

## 2020-01-10 PROCEDURE — 99213 OFFICE O/P EST LOW 20 MIN: CPT

## 2020-01-10 PROCEDURE — 1036F TOBACCO NON-USER: CPT | Performed by: INTERNAL MEDICINE

## 2020-01-10 PROCEDURE — 85378 FIBRIN DEGRADE SEMIQUANT: CPT

## 2020-01-10 PROCEDURE — 99212 OFFICE O/P EST SF 10 MIN: CPT | Performed by: INTERNAL MEDICINE

## 2020-01-10 NOTE — PROGRESS NOTES
Department of [place] [address]  Attending Clinic Note    Reason for Visit: Follow-up on a patient with hx of RLE DVT    PCP:  Angie Ritter DO    History of Present Illness:  45 y/o male who was at [facility] 05/29/2018 for RLE DVT. He was given Lovenox injections and then was started on Eliquis. He denied any shortness of breath. Denied any personal history of prior blood clots or family history of clotting disorders. He was doing a lot of traveling. No history of malignancy. On Eliquis 5 mg po bid with fair tolerance so far. On 06/29/2018  CMP normal except for Creat 1.4 Tbil 1.4  Serum JAK2 mutation Not detected  Peripheral blood flow cytometry noted no immunophenotypic evidence of acute leukemia or a B-cell neoplasm. Population of cells approximately 23% of all cells analyzed phenotypically consistent with T large granular lymphocytes. T-Cell Clonality by PCR Not detected. Serum Homocysteine 10.8 (0.0 - 15.0)  Anti-Thrombin  (%)  Protein S Ag, Free 97 (%)  Protein C functional 80  (%)  PILLO Negative  Anti-cardiolipin Ab Negative  Beta-2 Glycoprotein Ab Negative  DTVVT weak positive  Fibrinogen 303  PT 15.7   INR 1.4  D-dimer <200  CBC unremarkable. On 07/27/2019:  PTT 36.1 (24.5-35. 1)  Protein C 91% (%)  DRVVT Negative  Thrombophilia panel  MTHFR 1298 (A->C)    HETEROZYGOTE     D-Dimer <200 on 09/24/2019  Repeat RLE U/S 09/27/2019 Nonocclusive thrombus is seen within the distal superficial femoral, popliteal and peroneal veins  Admitted in Oct 2019 that he was noncompliant with Eliquis 5 mg p.o. twice daily. Compliance was strongly recommended. Review of Systems;  CONSTITUTIONAL: No fever, chills. Fair appetite and energy level. ENMT: Eyes: No diplopia; Nose: No epistaxis. Mouth: No sore throat. RESPIRATORY: No hemoptysis, shortness of breath. CARDIOVASCULAR: No chest pain, palpitations.   GASTROINTESTINAL: No nausea/vomiting, abdominal pain, diarrhea/constipation. GENITOURINARY: No dysuria, urinary frequency, hematuria. NEURO: No syncope, presyncope, headache. Remainder:  ROS NEGATIVE    Past Medical History:      Diagnosis Date    Arthritis     Hx of blood clots      Medications:  Reviewed and reconciled. Allergies:  No Known Allergies    Physical Exam:  /72 (Site: Left Upper Arm, Position: Sitting, Cuff Size: Large Adult)   Pulse 65   Temp 98.2 °F (36.8 °C) (Temporal)   Ht 6' 4\" (1.93 m)   Wt 259 lb (117.5 kg)   BMI 31.53 kg/m²   GENERAL: Alert, oriented x 3, not in acute distress. HEENT: PERRLA; EOMI. Oropharynx clear. NECK: Supple. Without lymphadenopathy. LUNGS: Good air entry bilaterally. No wheezing, crackles or ronchi. CARDIOVASCULAR: Regular rate. No murmurs, rubs or gallops. ABDOMEN: Soft. Non-tender, non-distended. Positive bowel sounds. EXTREMITIES: Without clubbing, cyanosis or edema  NEUROLOGIC: No focal deficits. Impression/Plan:  45 y/o male with Acute RLE DVT (DVT from the mid femoral vein into the calf), diagnosed in 05/29/2018 at [facility]. He denied any shortness of breath. Denied any personal history of prior blood clots or family history of clotting disorders. He was doing a lot of traveling. No history of cancer. On Eliquis 5 mg po bid with fair tolerance so far. On 06/29/2018  CMP normal except for Creat 1.4 Tbil 1.4  Serum JAK2 mutation Not detected  Peripheral blood flow cytometry noted no immunophenotypic evidence of acute leukemia or a B-cell neoplasm. Population of cells approximately 23% of all cells analyzed phenotypically consistent with T large granular lymphocytes. T-Cell Clonality by PCR Not detected.   Serum Homocysteine 10.8 (0.0 - 15.0)  Anti-Thrombin  (%)  Protein S Ag, Free 97 (%)  Protein C functional 80  (%)  PILLO Negative  Anti-cardiolipin Ab Negative  Beta-2 Glycoprotein Ab Negative  DTVVT weak positive  Fibrinogen 303  PT 15.7   INR 1.4  D-dimer <200  CBC unremarkable. On 07/27/2019:  PTT 36.1 (24.5-35. 1)  Protein C 91% (%)  DRVVT Negative  Thrombophilia panel  MTHFR 1298 (A->C)    HETEROZYGOTE     D-Dimer <200 on 09/24/2019  Repeat RLE U/S 09/27/2019 Nonocclusive thrombus is seen within the distal superficial femoral, popliteal and peroneal veins. Admitted in Oct 2019 that he was noncompliant with Eliquis 5 mg p.o. twice daily. Compliance was strongly recommended. He presented today 01/10/2020 for f/u and is telling us that he has been compliant with taking the Eliquis for the last 3 months. He is also exercising 4x/week. However, repeat RLE U/S on 01/03/2020 revealed chronic partially occluding deep venous thrombosis within the right femoral vein, popliteal vein and peroneal vein. Overall, there is no interval change when compared with the prior study. Will see Dr. Yara Joel at [facility] for further evaluation. Continue Eliquis 5 mg po bid.   RTC 3 months with prior RLE U/S    mAanda Jimenez MD   4/25/1923  Board Certified Medical Oncologist   Board Certified Internal Medicine

## 2020-01-13 ENCOUNTER — TELEPHONE (OUTPATIENT)
Dept: INFUSION THERAPY | Age: 40
End: 2020-01-13

## 2020-03-26 RX ORDER — APIXABAN 5 MG/1
TABLET, FILM COATED ORAL
Qty: 60 TABLET | Refills: 0 | OUTPATIENT
Start: 2020-03-26

## 2020-04-13 ENCOUNTER — HOSPITAL ENCOUNTER (OUTPATIENT)
Dept: INFUSION THERAPY | Age: 40
Discharge: HOME | End: 2020-04-13
Payer: COMMERCIAL

## 2020-04-13 ENCOUNTER — TELEPHONE (OUTPATIENT)
Dept: FAMILY MEDICINE CLINIC | Age: 40
End: 2020-04-13

## 2020-04-13 ENCOUNTER — HOSPITAL ENCOUNTER (OUTPATIENT)
Dept: ULTRASOUND IMAGING | Age: 40
Discharge: HOME | End: 2020-04-13
Payer: COMMERCIAL

## 2020-04-13 ENCOUNTER — TELEPHONE (OUTPATIENT)
Dept: INFUSION THERAPY | Age: 40
End: 2020-04-13

## 2020-04-13 DIAGNOSIS — D68.9 CLOTTING DISORDER (HCC): ICD-10-CM

## 2020-04-13 LAB
ALBUMIN SERPL-MCNC: 4.5 G/DL (ref 3.5–5.2)
ALP BLD-CCNC: 131 U/L (ref 40–129)
ALT SERPL-CCNC: 109 U/L (ref 0–40)
ANION GAP SERPL CALCULATED.3IONS-SCNC: 14 MMOL/L (ref 7–16)
AST SERPL-CCNC: 56 U/L (ref 0–39)
BASOPHILS ABSOLUTE: 0.02 E9/L (ref 0–0.2)
BASOPHILS RELATIVE PERCENT: 0.3 % (ref 0–2)
BILIRUB SERPL-MCNC: 0.9 MG/DL (ref 0–1.2)
BUN BLDV-MCNC: 11 MG/DL (ref 6–20)
CALCIUM SERPL-MCNC: 9 MG/DL (ref 8.6–10.2)
CHLORIDE BLD-SCNC: 104 MMOL/L (ref 98–107)
CO2: 22 MMOL/L (ref 22–29)
CREAT SERPL-MCNC: 1.3 MG/DL (ref 0.7–1.2)
D DIMER: <200 NG/ML DDU
EOSINOPHILS ABSOLUTE: 0.33 E9/L (ref 0.05–0.5)
EOSINOPHILS RELATIVE PERCENT: 5.1 % (ref 0–6)
GFR AFRICAN AMERICAN: >60
GFR NON-AFRICAN AMERICAN: >60 ML/MIN/1.73
GLUCOSE BLD-MCNC: 99 MG/DL (ref 74–99)
HCT VFR BLD CALC: 47.3 % (ref 37–54)
HEMOGLOBIN: 16 G/DL (ref 12.5–16.5)
IMMATURE GRANULOCYTES #: 0.01 E9/L
IMMATURE GRANULOCYTES %: 0.2 % (ref 0–5)
LYMPHOCYTES ABSOLUTE: 2.62 E9/L (ref 1.5–4)
LYMPHOCYTES RELATIVE PERCENT: 40.3 % (ref 20–42)
MCH RBC QN AUTO: 27.8 PG (ref 26–35)
MCHC RBC AUTO-ENTMCNC: 33.8 % (ref 32–34.5)
MCV RBC AUTO: 82.1 FL (ref 80–99.9)
MONOCYTES ABSOLUTE: 0.5 E9/L (ref 0.1–0.95)
MONOCYTES RELATIVE PERCENT: 7.7 % (ref 2–12)
NEUTROPHILS ABSOLUTE: 3.02 E9/L (ref 1.8–7.3)
NEUTROPHILS RELATIVE PERCENT: 46.4 % (ref 43–80)
PDW BLD-RTO: 12.8 FL (ref 11.5–15)
PLATELET # BLD: 193 E9/L (ref 130–450)
PMV BLD AUTO: 9 FL (ref 7–12)
POTASSIUM SERPL-SCNC: 4.1 MMOL/L (ref 3.5–5)
RBC # BLD: 5.76 E12/L (ref 3.8–5.8)
SODIUM BLD-SCNC: 140 MMOL/L (ref 132–146)
TOTAL PROTEIN: 8.1 G/DL (ref 6.4–8.3)
WBC # BLD: 6.5 E9/L (ref 4.5–11.5)

## 2020-04-13 PROCEDURE — 85378 FIBRIN DEGRADE SEMIQUANT: CPT

## 2020-04-13 PROCEDURE — 36415 COLL VENOUS BLD VENIPUNCTURE: CPT

## 2020-04-13 PROCEDURE — 80053 COMPREHEN METABOLIC PANEL: CPT

## 2020-04-13 PROCEDURE — 93971 EXTREMITY STUDY: CPT

## 2020-04-13 PROCEDURE — 85025 COMPLETE CBC W/AUTO DIFF WBC: CPT

## 2020-04-13 NOTE — TELEPHONE ENCOUNTER
Attempted to reach patient to schedule a virtual visit. Left detailed message on voicemail to return call to the office and schedule virtual visit before medication can be refilled.   Electronically signed by Myesha Sandoval on 4/13/2020 at 2:58 PM

## 2020-04-14 ENCOUNTER — OFFICE VISIT (OUTPATIENT)
Dept: ONCOLOGY | Age: 40
End: 2020-04-14
Payer: COMMERCIAL

## 2020-04-14 VITALS
SYSTOLIC BLOOD PRESSURE: 127 MMHG | DIASTOLIC BLOOD PRESSURE: 88 MMHG | TEMPERATURE: 97.4 F | OXYGEN SATURATION: 95 % | HEART RATE: 71 BPM | BODY MASS INDEX: 31.84 KG/M2 | HEIGHT: 76 IN | WEIGHT: 261.5 LBS

## 2020-04-14 PROCEDURE — 99214 OFFICE O/P EST MOD 30 MIN: CPT | Performed by: INTERNAL MEDICINE

## 2020-04-14 PROCEDURE — G8428 CUR MEDS NOT DOCUMENT: HCPCS | Performed by: INTERNAL MEDICINE

## 2020-04-14 PROCEDURE — G8417 CALC BMI ABV UP PARAM F/U: HCPCS | Performed by: INTERNAL MEDICINE

## 2020-04-14 PROCEDURE — 1036F TOBACCO NON-USER: CPT | Performed by: INTERNAL MEDICINE

## 2020-04-14 NOTE — PROGRESS NOTES
thrombosis extending from the mid femoral vein to the peroneal vein. No significant interval change. D-Dimer <200 on 04/13/2020. Last seen by podiatry Feb 2020 where they recommended brace, ambulation and return to clinic in fall 2020. Review of Systems;  CONSTITUTIONAL: No fever, chills. Fair appetite and energy level. ENMT: Eyes: No diplopia; Nose: No epistaxis. Mouth: No sore throat. RESPIRATORY: No hemoptysis, shortness of breath. CARDIOVASCULAR: No chest pain, palpitations. GASTROINTESTINAL: No nausea/vomiting, abdominal pain, diarrhea/constipation. GENITOURINARY: No dysuria, urinary frequency, hematuria. NEURO: No syncope, presyncope, headache. Remainder:  ROS NEGATIVE    Past Medical History:      Diagnosis Date    Arthritis     Hx of blood clots      Medications:  Reviewed and reconciled. Allergies:  No Known Allergies    Physical Exam:  /88 (Site: Right Upper Arm, Position: Sitting, Cuff Size: Large Adult)   Pulse 71   Temp 97.4 °F (36.3 °C) (Temporal)   Ht 6' 4\" (1.93 m)   Wt 261 lb 8 oz (118.6 kg)   SpO2 95%   BMI 31.83 kg/m²   GENERAL: Alert, oriented x 3, not in acute distress. HEENT: PERRLA; EOMI. Oropharynx clear. NECK: Supple. Without lymphadenopathy. LUNGS: Good air entry bilaterally. No wheezing, crackles or ronchi. CARDIOVASCULAR: Regular rate. No murmurs, rubs or gallops. ABDOMEN: Soft. Non-tender, non-distended. Positive bowel sounds. EXTREMITIES: Without clubbing, cyanosis or edema  NEUROLOGIC: No focal deficits. Impression/Plan:  43 y/o male with Acute RLE DVT (DVT from the mid femoral vein into the calf), diagnosed in 05/29/2018 at [facility]. He denied any shortness of breath. Denied any personal history of prior blood clots or family history of clotting disorders. He was doing a lot of traveling. No history of cancer. On Eliquis 5 mg po bid with fair tolerance so far.     On 06/29/2018  CMP normal except for Creat 1.4 Tbil 1.4  Serum JAK2 mutation Not detected  Peripheral blood flow cytometry noted no immunophenotypic evidence of acute leukemia or a B-cell neoplasm. Population of cells approximately 23% of all cells analyzed phenotypically consistent with T large granular lymphocytes. T-Cell Clonality by PCR Not detected. Serum Homocysteine 10.8 (0.0 - 15.0)  Anti-Thrombin  (%)  Protein S Ag, Free 97 (%)  Protein C functional 80  (%)  PILLO Negative  Anti-cardiolipin Ab Negative  Beta-2 Glycoprotein Ab Negative  DTVVT weak positive  Fibrinogen 303  PT 15.7   INR 1.4  D-dimer <200  CBC unremarkable. On 07/27/2019:  PTT 36.1 (24.5-35. 1)  Protein C 91% (%)  DRVVT Negative  Thrombophilia panel  MTHFR 1298 (A->C)    HETEROZYGOTE     D-Dimer <200 on 09/24/2019  Repeat RLE U/S 09/27/2019 Nonocclusive thrombus is seen within the distal superficial femoral, popliteal and peroneal veins. Admitted in Oct 2019 that he was noncompliant with Eliquis 5 mg p.o. twice daily. Compliance was strongly recommended. Repeat RLE U/S on 01/03/2020 revealed chronic partially occluding deep venous thrombosis within the right femoral vein, popliteal vein and peroneal vein. Overall, there is no interval change when compared with the prior study. D-Dimer <200 on 01/10/2020. Repeat RLE U/S on 04/13/2020 Redemonstration of nonocclusive thrombosis extending from the mid femoral vein to the peroneal vein. No significant interval change. D-Dimer <200 on 04/13/2020. Last seen by podiatry Feb 2020 where they recommended brace, ambulation and return to clinic in fall 2020. Continue Eliquis 5 mg po bid. To see Dr. Susan Riojas at [facility] in May 2020  RTC 3 months with labs.     Deepak Caldwell MD   9/22/2661  Board Certified Medical Oncologist   Board Certified Internal Medicine

## 2020-04-15 ENCOUNTER — VIRTUAL VISIT (OUTPATIENT)
Dept: FAMILY MEDICINE CLINIC | Age: 40
End: 2020-04-15
Payer: COMMERCIAL

## 2020-04-15 PROCEDURE — G8427 DOCREV CUR MEDS BY ELIG CLIN: HCPCS | Performed by: FAMILY MEDICINE

## 2020-04-15 PROCEDURE — 99213 OFFICE O/P EST LOW 20 MIN: CPT | Performed by: FAMILY MEDICINE

## 2020-04-15 PROCEDURE — G8417 CALC BMI ABV UP PARAM F/U: HCPCS | Performed by: FAMILY MEDICINE

## 2020-04-15 PROCEDURE — 1036F TOBACCO NON-USER: CPT | Performed by: FAMILY MEDICINE

## 2020-04-15 ASSESSMENT — ENCOUNTER SYMPTOMS
ABDOMINAL PAIN: 0
COUGH: 0
WHEEZING: 0
DIARRHEA: 0
VOMITING: 0
NAUSEA: 0
SHORTNESS OF BREATH: 0
CONSTIPATION: 0

## 2020-04-15 NOTE — PROGRESS NOTES
TeleMedicine Patient Consent    This visit was performed as a virtual video visit using a synchronous, two-way, audio-video telehealth technology platform. Patient identification was verified at the start of the visit, including the patient's telephone number and physical location. I discussed with the patient the nature of our telehealth visits, that:     1. Due to the nature of an audio- video modality, the only components of a physical exam that could be done are the elements supported by direct observation. 2. I would evaluate the patient and recommend diagnostics and treatments based on my assessment. 3. If it was felt that the patient should be evaluated in clinic or an emergency room setting, then they would be directed there. 4. Our sessions are not being recorded and that personal health information is protected. 5. Our team would provide follow up care in person if/when the patient needs it. Patient does agree to proceed with telemedicine consultation. Patient's location: home address in [facility]  Physician  location home address in [facility] other people involved in call none. [facility])  Family Medicine Outpatient      SUBJECTIVE:  CC: had concerns including Check-Up (PATIENT NEEDS CHECKUP AND REFILL OF MEDICAITON-ELIQUIS). Brittaney Montoya presented to the clinic for a routine visit. Zoey Hinojosa is a 44year old male presenting for a virtual visit for a refill of his Eliquis. He is following with heme/onc and last had an appointment yesterday which reported to continue his Eliquis bid at this time. His original clot was from 2018 and on recent ultrasound was 4/2020 was still there. He has a consultation with [facility] clinic in May. He denies any acute concerns. Review of Systems   Constitutional: Negative for appetite change, fatigue and fever. Respiratory: Negative for cough, shortness of breath and wheezing. Cardiovascular: Negative for chest pain and palpitations. Patient and/or guardian verbalizes understanding and agrees with above counseling, assessment and plan. All questions answered. Please note this report has been partially produced using speech recognition software  and may contain errors related to that system including grammar, punctuation and spelling as well as words and phrases that may seem inappropriate. If there are questions or concerns please feel free to contact me to clarify. Time spent: Greater than Not billed by time    This visit was completed virtually using D[employer]. me

## 2020-12-23 RX ORDER — APIXABAN 5 MG/1
TABLET, FILM COATED ORAL
Qty: 180 TABLET | Refills: 0 | OUTPATIENT
Start: 2020-12-23

## 2021-09-06 ENCOUNTER — HOSPITAL ENCOUNTER (EMERGENCY)
Age: 41
Discharge: HOME | End: 2021-09-06
Attending: EMERGENCY MEDICINE
Payer: COMMERCIAL

## 2021-09-06 VITALS
TEMPERATURE: 100.8 F | WEIGHT: 249 LBS | SYSTOLIC BLOOD PRESSURE: 121 MMHG | RESPIRATION RATE: 18 BRPM | HEIGHT: 76 IN | OXYGEN SATURATION: 97 % | BODY MASS INDEX: 30.32 KG/M2 | HEART RATE: 80 BPM | DIASTOLIC BLOOD PRESSURE: 85 MMHG

## 2021-09-06 DIAGNOSIS — J20.8 ACUTE BRONCHITIS DUE TO COVID-19 VIRUS: Primary | ICD-10-CM

## 2021-09-06 DIAGNOSIS — U07.1 ACUTE BRONCHITIS DUE TO COVID-19 VIRUS: Primary | ICD-10-CM

## 2021-09-06 LAB
INFLUENZA A BY PCR: NOT DETECTED
INFLUENZA B BY PCR: NOT DETECTED
SARS-COV-2, NAAT: DETECTED

## 2021-09-06 PROCEDURE — 6370000000 HC RX 637 (ALT 250 FOR IP): Performed by: EMERGENCY MEDICINE

## 2021-09-06 PROCEDURE — 99285 EMERGENCY DEPT VISIT HI MDM: CPT

## 2021-09-06 PROCEDURE — 6360000002 HC RX W HCPCS: Performed by: EMERGENCY MEDICINE

## 2021-09-06 PROCEDURE — 87502 INFLUENZA DNA AMP PROBE: CPT

## 2021-09-06 PROCEDURE — 87635 SARS-COV-2 COVID-19 AMP PRB: CPT

## 2021-09-06 RX ORDER — DEXAMETHASONE 6 MG/1
6 TABLET ORAL
Qty: 7 TABLET | Refills: 0 | Status: SHIPPED | OUTPATIENT
Start: 2021-09-06 | End: 2021-09-13

## 2021-09-06 RX ORDER — IBUPROFEN 400 MG/1
400 TABLET ORAL ONCE
Status: COMPLETED | OUTPATIENT
Start: 2021-09-06 | End: 2021-09-06

## 2021-09-06 RX ORDER — ALBUTEROL SULFATE 90 UG/1
2 AEROSOL, METERED RESPIRATORY (INHALATION) EVERY 6 HOURS PRN
Qty: 18 G | Refills: 0 | Status: SHIPPED | OUTPATIENT
Start: 2021-09-06 | End: 2021-10-01 | Stop reason: CLARIF

## 2021-09-06 RX ADMIN — IBUPROFEN 400 MG: 400 TABLET, FILM COATED ORAL at 08:32

## 2021-09-06 RX ADMIN — DEXAMETHASONE 6 MG: 2 TABLET ORAL at 09:28

## 2021-09-06 ASSESSMENT — ENCOUNTER SYMPTOMS
VOICE CHANGE: 0
COUGH: 0
DIARRHEA: 0
PHOTOPHOBIA: 0
NAUSEA: 0
VOMITING: 0
ABDOMINAL PAIN: 0
TROUBLE SWALLOWING: 0

## 2021-09-06 ASSESSMENT — PAIN SCALES - GENERAL
PAINLEVEL_OUTOF10: 6
PAINLEVEL_OUTOF10: 6

## 2021-09-06 NOTE — ED PROVIDER NOTES
HPI   Patient is a 80-year-old male presenting due to 3-day history of severe myalgias. Patient states this is symptoms started on Friday and have progressively gotten worse. He endorses chills, congestion, fatigue and decreased p.o. intake. Patient otherwise denies any chest pain, shortness of breath, severe cough, fever, nausea or vomiting. Patient denies any recent sick contacts. Patient denies any exposure to COVID-19. Patient is not vaccinated for COVID-19. Symptoms are mild to moderate and have been persistent which prompted the emergency department. No exacerbating or remitting factors noted. Review of Systems   Constitutional: Positive for appetite change, chills and fatigue. Negative for fever. HENT: Negative for trouble swallowing and voice change. Eyes: Negative for photophobia and visual disturbance. Respiratory: Negative for cough. Cardiovascular: Negative for chest pain and palpitations. Gastrointestinal: Negative for abdominal pain, diarrhea, nausea and vomiting. Genitourinary: Negative for dysuria. Musculoskeletal: Positive for myalgias. Negative for arthralgias. Skin: Negative for rash and wound. Neurological: Negative for dizziness and headaches. Psychiatric/Behavioral: Negative for behavioral problems and confusion. Physical Exam  Constitutional:       General: He is not in acute distress. Appearance: Normal appearance. He is not ill-appearing. HENT:      Head: Normocephalic and atraumatic. Nose: Nose normal.      Mouth/Throat:      Mouth: Mucous membranes are moist.      Pharynx: Oropharynx is clear. Eyes:      Pupils: Pupils are equal, round, and reactive to light. Cardiovascular:      Rate and Rhythm: Normal rate and regular rhythm. Pulses: Normal pulses. Heart sounds: Normal heart sounds. Pulmonary:      Effort: Pulmonary effort is normal. No respiratory distress. Breath sounds: Normal breath sounds.  No wheezing or rales.   Abdominal:      General: Abdomen is flat. Palpations: Abdomen is soft. Tenderness: There is no abdominal tenderness. There is no guarding or rebound. Musculoskeletal:      Cervical back: Normal range of motion and neck supple. Skin:     General: Skin is warm and dry. Capillary Refill: Capillary refill takes less than 2 seconds. Neurological:      General: No focal deficit present. Mental Status: He is alert and oriented to person, place, and time. Cranial Nerves: No cranial nerve deficit. Coordination: Coordination normal.   Psychiatric:         Mood and Affect: Mood normal.         Behavior: Behavior normal.          MDM   Patient is a 63-year-old male with no pertinent past medical history presenting due to 3-day history of myalgias. Patient states that symptoms were persistent at home which went to the emergency department. Given patient's presentation, work-up included nasal swab for Covid and influenza. Patient COVID-19 test came back positive. Throughout ED stay, patient was hemodynamically stable and with no signs or symptoms of acute respiratory distress. These results were discussed with the patient who agrees to discharge. Attending physician discussed symptomatic and supportive care at home and need for pulse ox with patient. Patient given return precautions in case of new or worsening symptoms. ED Course as of Sep 06 0911   Mon Sep 06, 2021   0097 ATTENDING PROVIDER ATTESTATION:     I have personally performed and/or participated in the history, exam, medical decision making, and procedures and agree with all pertinent clinical information unless otherwise noted. I have also reviewed and agree with the past medical, family and social history unless otherwise noted. I have discussed this patient in detail with the resident and provided the instruction and education regarding the evidence-based evaluation and treatment of myalgia.     History: Patient states 3 days ago, he began having general fatigue, myalgia and cough. He has not been vaccinated for Covid. Unknown exposure. He denies any nausea, vomiting, diarrhea, chest pain or other complaints. My findings: Jacques Stock is a 39 y.o. male whom is in no distress. Physical exam reveals he is alert and oriented. Heart is regular, lungs are coarse. Abdomen soft nontender. Extremities are intact no edema. He appears nontoxic. My plan: Symptomatic and supportive care. Covid and strep testing. Motrin for low-grade fever here. Electronically signed by Marivel Magana DO on 9/6/21 at 8:17 AM EDT          [TG]   0998 Discussed results with the patient as well as home care. Discussed pulse oximeter use. [TG]      ED Course User Index  [TG] Marivel Magana DO          --------------------------------------------- PAST HISTORY ---------------------------------------------  Past Medical History:  has a past medical history of Arthritis and Hx of blood clots. Past Surgical History:  has no past surgical history on file. Social History:  reports that he has never smoked. He has never used smokeless tobacco. He reports that he does not drink alcohol and does not use drugs. Family History: family history includes High Cholesterol in his brother; Kidney Disease in his brother; No Known Problems in his father, mother, and sister. The patients home medications have been reviewed. Allergies: Patient has no known allergies.     -------------------------------------------------- RESULTS -------------------------------------------------  Labs:  Results for orders placed or performed during the hospital encounter of 09/06/21   Rapid influenza A/B antigens    Specimen: Nasopharyngeal   Result Value Ref Range    Influenza A by PCR Not Detected Not Detected    Influenza B by PCR Not Detected Not Detected   COVID-19, Rapid    Specimen: Nasopharyngeal Swab   Result Value Ref Range SARS-CoV-2, NAAT DETECTED (A) Not Detected       Radiology:  No orders to display       ------------------------- NURSING NOTES AND VITALS REVIEWED ---------------------------  Date / Time Roomed:  9/6/2021  8:02 AM  ED Bed Assignment:  09/09    The nursing notes within the ED encounter and vital signs as below have been reviewed. /85   Pulse 85   Temp 100.8 °F (38.2 °C) (Infrared)   Resp 18   Ht 6' 4\" (1.93 m)   Wt 249 lb (112.9 kg)   SpO2 96%   BMI 30.31 kg/m²   Oxygen Saturation Interpretation: Normal      ------------------------------------------ PROGRESS NOTES ------------------------------------------  I have spoken with the patient and discussed todays results, in addition to providing specific details for the plan of care and counseling regarding the diagnosis and prognosis. Their questions are answered at this time and they are agreeable with the plan. I discussed at length with them reasons for immediate return here for re evaluation. They will followup with primary care by calling their office tomorrow. --------------------------------- ADDITIONAL PROVIDER NOTES ---------------------------------  At this time the patient is without objective evidence of an acute process requiring hospitalization or inpatient management. They have remained hemodynamically stable throughout their entire ED visit and are stable for discharge with outpatient follow-up. The plan has been discussed in detail and they are aware of the specific conditions for emergent return, as well as the importance of follow-up. New Prescriptions    ALBUTEROL SULFATE HFA (PROAIR HFA) 108 (90 BASE) MCG/ACT INHALER    Inhale 2 puffs into the lungs every 6 hours as needed for Wheezing    DEXAMETHASONE (DECADRON) 6 MG TABLET    Take 1 tablet by mouth daily (with breakfast) for 7 days       Diagnosis:  1.  Acute bronchitis due to COVID-19 virus         Disposition:  Patient's disposition: Discharge to home  Patient's condition is stable.            Tia Grooms, DO  Resident  09/06/21 2541

## 2021-09-07 ENCOUNTER — CARE COORDINATION (OUTPATIENT)
Dept: CARE COORDINATION | Age: 41
End: 2021-09-07

## 2021-09-07 NOTE — CARE COORDINATION
Date/Time:  9/7/2021 10:21 AM  Attempted to reach patient by telephone. Left HIPAA compliant message requesting a return call. Will attempt to reach patient again.

## 2021-09-08 NOTE — CARE COORDINATION
Date/Time:  9/8/2021 2:06 PM  Attempted to reach patient by telephone. Left HIPAA compliant message requesting a return call. Unable to contact X2 attempts. Will resolve episode.

## 2021-10-01 ENCOUNTER — VIRTUAL VISIT (OUTPATIENT)
Dept: FAMILY MEDICINE CLINIC | Age: 41
End: 2021-10-01
Payer: COMMERCIAL

## 2021-10-01 DIAGNOSIS — R05.9 COUGH: ICD-10-CM

## 2021-10-01 DIAGNOSIS — R06.02 SHORTNESS OF BREATH: ICD-10-CM

## 2021-10-01 DIAGNOSIS — U07.1 COVID: Primary | ICD-10-CM

## 2021-10-01 PROBLEM — D68.9 CLOTTING DISORDER (HCC): Status: RESOLVED | Noted: 2018-07-17 | Resolved: 2021-10-01

## 2021-10-01 PROCEDURE — G8484 FLU IMMUNIZE NO ADMIN: HCPCS | Performed by: FAMILY MEDICINE

## 2021-10-01 PROCEDURE — 99214 OFFICE O/P EST MOD 30 MIN: CPT | Performed by: FAMILY MEDICINE

## 2021-10-01 PROCEDURE — G8417 CALC BMI ABV UP PARAM F/U: HCPCS | Performed by: FAMILY MEDICINE

## 2021-10-01 PROCEDURE — 1036F TOBACCO NON-USER: CPT | Performed by: FAMILY MEDICINE

## 2021-10-01 PROCEDURE — G8427 DOCREV CUR MEDS BY ELIG CLIN: HCPCS | Performed by: FAMILY MEDICINE

## 2021-10-01 RX ORDER — BUDESONIDE AND FORMOTEROL FUMARATE DIHYDRATE 80; 4.5 UG/1; UG/1
2 AEROSOL RESPIRATORY (INHALATION) 2 TIMES DAILY
Qty: 10.2 G | Refills: 3
Start: 2021-10-01

## 2021-10-01 RX ORDER — AZITHROMYCIN 250 MG/1
250 TABLET, FILM COATED ORAL SEE ADMIN INSTRUCTIONS
Qty: 6 TABLET | Refills: 0 | Status: SHIPPED | OUTPATIENT
Start: 2021-10-01 | End: 2021-10-06

## 2021-10-01 RX ORDER — BENZONATATE 100 MG/1
100 CAPSULE ORAL 2 TIMES DAILY PRN
Qty: 20 CAPSULE | Refills: 0 | Status: SHIPPED | OUTPATIENT
Start: 2021-10-01 | End: 2021-10-11

## 2021-10-01 RX ORDER — ASPIRIN 325 MG
325 TABLET, DELAYED RELEASE (ENTERIC COATED) ORAL DAILY
Qty: 30 TABLET | Refills: 3 | Status: SHIPPED
Start: 2021-10-01 | End: 2021-10-25

## 2021-10-01 SDOH — ECONOMIC STABILITY: FOOD INSECURITY: WITHIN THE PAST 12 MONTHS, YOU WORRIED THAT YOUR FOOD WOULD RUN OUT BEFORE YOU GOT MONEY TO BUY MORE.: NEVER TRUE

## 2021-10-01 SDOH — ECONOMIC STABILITY: FOOD INSECURITY: WITHIN THE PAST 12 MONTHS, THE FOOD YOU BOUGHT JUST DIDN'T LAST AND YOU DIDN'T HAVE MONEY TO GET MORE.: NEVER TRUE

## 2021-10-01 ASSESSMENT — SOCIAL DETERMINANTS OF HEALTH (SDOH): HOW HARD IS IT FOR YOU TO PAY FOR THE VERY BASICS LIKE FOOD, HOUSING, MEDICAL CARE, AND HEATING?: NOT HARD AT ALL

## 2021-10-01 ASSESSMENT — PATIENT HEALTH QUESTIONNAIRE - PHQ9
1. LITTLE INTEREST OR PLEASURE IN DOING THINGS: 0
SUM OF ALL RESPONSES TO PHQ9 QUESTIONS 1 & 2: 0
SUM OF ALL RESPONSES TO PHQ QUESTIONS 1-9: 0
2. FEELING DOWN, DEPRESSED OR HOPELESS: 0

## 2021-10-01 NOTE — Clinical Note
Call pt and ask how he is feeling with covid    if any cough or shortness of breath remaining    tell pt to for chest xray    I put the order in

## 2021-10-07 PROBLEM — R05.9 COUGH: Status: ACTIVE | Noted: 2021-10-07

## 2021-10-07 PROBLEM — R06.02 SHORTNESS OF BREATH: Status: ACTIVE | Noted: 2021-10-07

## 2021-10-07 PROBLEM — U07.1 COVID: Status: ACTIVE | Noted: 2021-10-07

## 2021-10-07 ASSESSMENT — ENCOUNTER SYMPTOMS
DIARRHEA: 0
EYE PAIN: 0
CONSTIPATION: 0
SORE THROAT: 0
COUGH: 1
RECTAL PAIN: 0
CHEST TIGHTNESS: 0
VOICE CHANGE: 0
NAUSEA: 0
VOMITING: 0
SHORTNESS OF BREATH: 1
EYE REDNESS: 0
ABDOMINAL PAIN: 0
CHOKING: 0
SINUS PAIN: 0
STRIDOR: 0
PHOTOPHOBIA: 0
COLOR CHANGE: 0
BLOOD IN STOOL: 0
ANAL BLEEDING: 0
FACIAL SWELLING: 0
BACK PAIN: 0
WHEEZING: 0
EYE DISCHARGE: 0
ABDOMINAL DISTENTION: 0
RHINORRHEA: 0
EYES NEGATIVE: 1
EYE ITCHING: 0
SINUS PRESSURE: 0
GASTROINTESTINAL NEGATIVE: 1
TROUBLE SWALLOWING: 0

## 2021-10-07 NOTE — PROGRESS NOTES
Jacqueline Oropeza is a 39 y.o. male. HPI/Chief C/O:  Chief Complaint   Patient presents with    Post-COVID Symptoms     Pt was diagnosed with COVID 9/6, still has ongoing cough and SOB    Other     Pt consents to the telemedicine visit and is currently in [place]     No Known Allergies    This 39year old male presents with positive covid from 9/6/2021. Pt c/o nasal congestion, cough, shortness of breath, and fatigue. Pt denies chest pain, denies nausea and vomiting, and denies fever. ROS:  Review of Systems   Constitutional: Positive for activity change and fatigue. Negative for appetite change, chills, diaphoresis, fever and unexpected weight change. HENT: Positive for congestion. Negative for dental problem, drooling, ear discharge, ear pain, facial swelling, hearing loss, mouth sores, nosebleeds, postnasal drip, rhinorrhea, sinus pressure, sinus pain, sneezing, sore throat, tinnitus, trouble swallowing and voice change. Eyes: Negative. Negative for photophobia, pain, discharge, redness, itching and visual disturbance. Respiratory: Positive for cough and shortness of breath. Negative for choking, chest tightness, wheezing and stridor. Cardiovascular: Negative for chest pain, palpitations and leg swelling. Gastrointestinal: Negative. Negative for abdominal distention, abdominal pain, anal bleeding, blood in stool, constipation, diarrhea, nausea, rectal pain and vomiting. Endocrine: Negative. Negative for cold intolerance, heat intolerance, polydipsia, polyphagia and polyuria. Genitourinary: Negative. Negative for decreased urine volume, difficulty urinating, dysuria, flank pain, frequency, hematuria and urgency. Musculoskeletal: Negative. Negative for arthralgias, back pain, gait problem, joint swelling, myalgias, neck pain and neck stiffness. Skin: Negative. Negative for color change, pallor, rash and wound. Neurological: Negative.   Negative for dizziness, tremors, seizures, syncope, facial asymmetry, speech difficulty, weakness, light-headedness, numbness and headaches. Hematological: Negative. Psychiatric/Behavioral: Positive for sleep disturbance. Negative for agitation, behavioral problems, confusion, decreased concentration, dysphoric mood, hallucinations, self-injury and suicidal ideas. The patient is nervous/anxious. The patient is not hyperactive. Past Medical/Surgical Hx;  Reviewed with patient      Diagnosis Date    Arthritis     Hx of blood clots      No past surgical history on file. Past Family Hx:  Reviewed with patient      Problem Relation Age of Onset    No Known Problems Mother     No Known Problems Father     No Known Problems Sister     Kidney Disease Brother     High Cholesterol Brother        Social Hx:  Reviewed with patient  Social History     Tobacco Use    Smoking status: Never Smoker    Smokeless tobacco: Never Used   Substance Use Topics    Alcohol use: No       OBJECTIVE  There were no vitals taken for this visit. Problem List:  Edgard Musa does not have any pertinent problems on file. PHYS EX:  Pt has nasal congestion, and cough    ASSESSMENT/PLAN  Edgard Musa was seen today for post-covid symptoms and other. Diagnoses and all orders for this visit:    COVID  -     azithromycin (ZITHROMAX) 250 MG tablet; Take 1 tablet by mouth See Admin Instructions for 5 days 500mg on day 1 followed by 250mg on days 2 - 5  -     benzonatate (TESSALON) 100 MG capsule; Take 1 capsule by mouth 2 times daily as needed for Cough  -     budesonide-formoterol (SYMBICORT) 80-4.5 MCG/ACT AERO; Inhale 2 puffs into the lungs 2 times daily  -     aspirin 325 MG EC tablet; Take 1 tablet by mouth daily    Cough  -     benzonatate (TESSALON) 100 MG capsule; Take 1 capsule by mouth 2 times daily as needed for Cough  -     budesonide-formoterol (SYMBICORT) 80-4.5 MCG/ACT AERO;  Inhale 2 puffs into the lungs 2 times daily    Shortness of breath  - benzonatate (TESSALON) 100 MG capsule; Take 1 capsule by mouth 2 times daily as needed for Cough  -     budesonide-formoterol (SYMBICORT) 80-4.5 MCG/ACT AERO; Inhale 2 puffs into the lungs 2 times daily    Please take tylenol every 6 hours as needed for temperature or aches   Hydrate with 40 to 50 oz fluids   I have sent in medication for you  Please stay in touch and let me know how you are doing  If you get worse, please call asap  You should self isolate     Pt instructed if any worse go ED ASAP. Outpatient Encounter Medications as of 10/1/2021   Medication Sig Dispense Refill    Ascorbic Acid (VITAMIN C PO) Take by mouth      VITAMIN D PO Take by mouth      [] azithromycin (ZITHROMAX) 250 MG tablet Take 1 tablet by mouth See Admin Instructions for 5 days 500mg on day 1 followed by 250mg on days 2 - 5 6 tablet 0    benzonatate (TESSALON) 100 MG capsule Take 1 capsule by mouth 2 times daily as needed for Cough 20 capsule 0    budesonide-formoterol (SYMBICORT) 80-4.5 MCG/ACT AERO Inhale 2 puffs into the lungs 2 times daily 10.2 g 3    aspirin 325 MG EC tablet Take 1 tablet by mouth daily 30 tablet 3    [DISCONTINUED] albuterol sulfate HFA (PROAIR HFA) 108 (90 Base) MCG/ACT inhaler Inhale 2 puffs into the lungs every 6 hours as needed for Wheezing 18 g 0     No facility-administered encounter medications on file as of 10/1/2021. No follow-ups on file.         Reviewed recent labs related to Arturo's current problems      Discussed importance of regular Health Maintenance follow up  Health Maintenance   Topic    Hepatitis C screen     Varicella vaccine (1 of 2 - 2-dose childhood series)    COVID-19 Vaccine (1)    HIV screen     Flu vaccine (1)    Lipid screen     DTaP/Tdap/Td vaccine (3 - Td or Tdap)    Hepatitis A vaccine     Hepatitis B vaccine     Hib vaccine     Meningococcal (ACWY) vaccine     Pneumococcal 0-64 years Vaccine

## 2021-10-24 DIAGNOSIS — U07.1 COVID: ICD-10-CM

## 2021-11-06 PROBLEM — R05.9 COUGH: Status: RESOLVED | Noted: 2021-10-07 | Resolved: 2021-11-06

## 2021-11-30 ENCOUNTER — VIRTUAL VISIT (OUTPATIENT)
Dept: FAMILY MEDICINE CLINIC | Age: 41
End: 2021-11-30
Payer: COMMERCIAL

## 2021-11-30 DIAGNOSIS — U07.1 COVID: Primary | ICD-10-CM

## 2021-11-30 DIAGNOSIS — E78.5 DYSLIPIDEMIA: ICD-10-CM

## 2021-11-30 DIAGNOSIS — R53.83 FATIGUE, UNSPECIFIED TYPE: ICD-10-CM

## 2021-11-30 DIAGNOSIS — L30.9 DERMATITIS: ICD-10-CM

## 2021-11-30 DIAGNOSIS — R73.01 IFG (IMPAIRED FASTING GLUCOSE): ICD-10-CM

## 2021-11-30 PROCEDURE — G8427 DOCREV CUR MEDS BY ELIG CLIN: HCPCS | Performed by: FAMILY MEDICINE

## 2021-11-30 PROCEDURE — 99213 OFFICE O/P EST LOW 20 MIN: CPT | Performed by: FAMILY MEDICINE

## 2021-11-30 PROCEDURE — G8417 CALC BMI ABV UP PARAM F/U: HCPCS | Performed by: FAMILY MEDICINE

## 2021-11-30 PROCEDURE — 1036F TOBACCO NON-USER: CPT | Performed by: FAMILY MEDICINE

## 2021-11-30 PROCEDURE — G8484 FLU IMMUNIZE NO ADMIN: HCPCS | Performed by: FAMILY MEDICINE

## 2021-11-30 RX ORDER — CLOTRIMAZOLE AND BETAMETHASONE DIPROPIONATE 10; .64 MG/G; MG/G
CREAM TOPICAL
Qty: 1 EACH | Refills: 3 | Status: SHIPPED | OUTPATIENT
Start: 2021-11-30

## 2021-11-30 RX ORDER — DOXYCYCLINE HYCLATE 100 MG
100 TABLET ORAL 2 TIMES DAILY
Qty: 20 TABLET | Refills: 0 | Status: SHIPPED | OUTPATIENT
Start: 2021-11-30 | End: 2021-12-10

## 2021-11-30 RX ORDER — METHYLPREDNISOLONE 4 MG/1
TABLET ORAL
Qty: 1 KIT | Refills: 0 | Status: SHIPPED | OUTPATIENT
Start: 2021-11-30 | End: 2021-12-06

## 2021-11-30 ASSESSMENT — ENCOUNTER SYMPTOMS
VOICE CHANGE: 0
FACIAL SWELLING: 0
ORTHOPNEA: 0
APNEA: 0
NAUSEA: 0
EYES NEGATIVE: 1
CHOKING: 0
SINUS PRESSURE: 0
ABDOMINAL DISTENTION: 0
SINUS PAIN: 0
CONSTIPATION: 0
COLOR CHANGE: 0
EYE ITCHING: 0
SHORTNESS OF BREATH: 0
EYE DISCHARGE: 0
ANAL BLEEDING: 0
WHEEZING: 0
RECTAL PAIN: 0
ABDOMINAL PAIN: 0
EYE REDNESS: 0
CHEST TIGHTNESS: 0
GASTROINTESTINAL NEGATIVE: 1
STRIDOR: 0
TROUBLE SWALLOWING: 0
PHOTOPHOBIA: 0
BLURRED VISION: 0
BLOOD IN STOOL: 0
BACK PAIN: 0

## 2021-11-30 NOTE — PROGRESS NOTES
Annetta Mathews is a 39 y.o. male. HPI/Chief C/O:  Chief Complaint   Patient presents with    Rash     Pt states he was in the hospital with covid on 9/06/2021. Pt states he developed a rash on face that has now spread all over face     No Known Allergies  TeleMedicine Video Visit    Allison Hernández, was evaluated through a synchronous (real-time) audio-video encounter. The patient (or guardian if applicable) is aware that this is a billable service. Verbal consent to proceed has been obtained within the past 12 months. The visit was conducted pursuant to the emergency declaration under the [address] [place] [address], [address] [place] [address] authority and the [employer] General Act. Patient identification was verified, and a caregiver was present when appropriate. The patient was located in a state where the provider was credentialed to provide care. Patient identification was verified at the start of the visit, including the patient's telephone number and physical location. I discussed with the patient the nature of our telehealth visits, that:     Due to the nature of an audio- video modality, the only components of a physical exam that could be done are the elements supported by direct observation. I would evaluate the patient and recommend diagnostics and treatments based on my assessment. If it was felt that the patient should be evaluated in clinic or an emergency room setting, then they would be directed there. Our sessions are not being recorded and that personal health information is protected. Our team would provide follow up care in person if/when the patient needs it.            Patient's location: home address in [facility]  Physician  location home address in [facility] other people involved in call  ,are none      On this date 11/30/2021 I have spent 25  minutes reviewing previous notes, test results and face to face (virtual) with the patient discussing the diagnosis and importance of compliance with the treatment plan as well as documenting on the day of the visit. ,     This visit was completed virtually using Doxy. me  C/O a rash after Covid             Hypertension  Associated symptoms include anxiety and malaise/fatigue. Pertinent negatives include no blurred vision, chest pain, headaches, neck pain, orthopnea, palpitations, peripheral edema, PND, shortness of breath or sweats. Risk factors for coronary artery disease include male gender and stress. Past treatments include lifestyle changes. The current treatment provides significant improvement. Compliance problems include diet. There is no history of angina, kidney disease, CAD/MI, CVA, heart failure, left ventricular hypertrophy, PVD or retinopathy. There is no history of chronic renal disease, coarctation of the aorta, hyperaldosteronism, hypercortisolism, hyperparathyroidism, a hypertension causing med, pheochromocytoma, renovascular disease, sleep apnea or a thyroid problem. ROS:  Review of Systems   Constitutional: Positive for malaise/fatigue. Negative for activity change, appetite change, chills, diaphoresis and unexpected weight change. HENT: Negative for dental problem, drooling, ear discharge, ear pain, facial swelling, hearing loss, mouth sores, nosebleeds, postnasal drip, sinus pressure, sinus pain, sneezing, tinnitus, trouble swallowing and voice change. Eyes: Negative. Negative for blurred vision, photophobia, discharge, redness, itching and visual disturbance. Respiratory: Negative for apnea, choking, chest tightness, shortness of breath, wheezing and stridor. Cardiovascular: Negative for chest pain, palpitations, orthopnea, leg swelling and PND. Gastrointestinal: Negative. Negative for abdominal distention, abdominal pain, anal bleeding, blood in stool, constipation, nausea and rectal pain. Endocrine: Negative.   Negative for cold intolerance, heat intolerance, polydipsia, polyphagia and polyuria. Genitourinary: Negative. Negative for decreased urine volume, difficulty urinating, dysuria, enuresis, flank pain, frequency, genital sores, hematuria, penile discharge, penile pain, penile swelling, scrotal swelling, testicular pain and urgency. Musculoskeletal: Negative. Negative for arthralgias, back pain, gait problem, joint swelling, myalgias, neck pain and neck stiffness. Skin: Negative for color change, pallor and wound. Neurological: Negative. Negative for dizziness, tremors, seizures, syncope, facial asymmetry, speech difficulty, weakness, light-headedness, numbness and headaches. Hematological: Negative. Negative for adenopathy. Does not bruise/bleed easily. Psychiatric/Behavioral: Positive for sleep disturbance. Negative for agitation, behavioral problems, confusion, decreased concentration, dysphoric mood, hallucinations, self-injury and suicidal ideas. The patient is nervous/anxious. The patient is not hyperactive. Past Medical/Surgical Hx;  Reviewed with patient      Diagnosis Date    Arthritis     Hx of blood clots      No past surgical history on file. Past Family Hx:  Reviewed with patient      Problem Relation Age of Onset    No Known Problems Mother     No Known Problems Father     No Known Problems Sister     Kidney Disease Brother     High Cholesterol Brother        Social Hx:  Reviewed with patient  Social History     Tobacco Use    Smoking status: Never Smoker    Smokeless tobacco: Never Used   Substance Use Topics    Alcohol use: No       OBJECTIVE  There were no vitals taken for this visit. Problem List:  Branden Ferris does not have any pertinent problems on file. PHYS EX:  General: Awake/Alert/Oriented/No Acute Distress      ASSESSMENT/PLAN  There are no diagnoses linked to this encounter.     Outpatient Encounter Medications as of 11/30/2021   Medication Sig Dispense Refill    aspirin 325 MG EC tablet TAKE 1 TABLET BY MOUTH EVERY DAY 90 tablet 1    Ascorbic Acid (VITAMIN C PO) Take by mouth (Patient not taking: Reported on 11/30/2021)      VITAMIN D PO Take by mouth (Patient not taking: Reported on 11/30/2021)      budesonide-formoterol (SYMBICORT) 80-4.5 MCG/ACT AERO Inhale 2 puffs into the lungs 2 times daily (Patient not taking: Reported on 11/30/2021) 10.2 g 3     No facility-administered encounter medications on file as of 11/30/2021. No follow-ups on file.         Reviewed recent labs related to Arturo's current problems      Discussed importance of regular Health Maintenance follow up  Health Maintenance   Topic    Hepatitis C screen     Varicella vaccine (1 of 2 - 2-dose childhood series)    COVID-19 Vaccine (1)    HIV screen     Flu vaccine (1)    Lipid screen     DTaP/Tdap/Td vaccine (3 - Td or Tdap)    Hepatitis A vaccine     Hepatitis B vaccine     Hib vaccine     Meningococcal (ACWY) vaccine     Pneumococcal 0-64 years Vaccine

## 2021-12-17 ENCOUNTER — OFFICE VISIT (OUTPATIENT)
Dept: FAMILY MEDICINE CLINIC | Age: 41
End: 2021-12-17
Payer: COMMERCIAL

## 2021-12-17 VITALS
WEIGHT: 251 LBS | OXYGEN SATURATION: 97 % | DIASTOLIC BLOOD PRESSURE: 70 MMHG | RESPIRATION RATE: 16 BRPM | SYSTOLIC BLOOD PRESSURE: 126 MMHG | TEMPERATURE: 97.9 F | HEART RATE: 71 BPM | HEIGHT: 76 IN | BODY MASS INDEX: 30.56 KG/M2

## 2021-12-17 DIAGNOSIS — L71.9 ROSACEA: Primary | ICD-10-CM

## 2021-12-17 DIAGNOSIS — R73.01 IFG (IMPAIRED FASTING GLUCOSE): ICD-10-CM

## 2021-12-17 DIAGNOSIS — R53.83 FATIGUE, UNSPECIFIED TYPE: ICD-10-CM

## 2021-12-17 DIAGNOSIS — E78.5 DYSLIPIDEMIA: ICD-10-CM

## 2021-12-17 PROCEDURE — 1036F TOBACCO NON-USER: CPT | Performed by: FAMILY MEDICINE

## 2021-12-17 PROCEDURE — G8484 FLU IMMUNIZE NO ADMIN: HCPCS | Performed by: FAMILY MEDICINE

## 2021-12-17 PROCEDURE — G8427 DOCREV CUR MEDS BY ELIG CLIN: HCPCS | Performed by: FAMILY MEDICINE

## 2021-12-17 PROCEDURE — 99213 OFFICE O/P EST LOW 20 MIN: CPT | Performed by: FAMILY MEDICINE

## 2021-12-17 PROCEDURE — G8417 CALC BMI ABV UP PARAM F/U: HCPCS | Performed by: FAMILY MEDICINE

## 2021-12-17 RX ORDER — DOXYCYCLINE HYCLATE 100 MG
100 TABLET ORAL 2 TIMES DAILY
Qty: 20 TABLET | Refills: 0 | Status: SHIPPED | OUTPATIENT
Start: 2021-12-17 | End: 2021-12-27

## 2021-12-17 RX ORDER — METRONIDAZOLE 7.5 MG/G
GEL TOPICAL
Qty: 1 EACH | Refills: 2 | Status: SHIPPED | OUTPATIENT
Start: 2021-12-17

## 2021-12-17 ASSESSMENT — ENCOUNTER SYMPTOMS
SINUS PAIN: 0
EYE DISCHARGE: 0
GASTROINTESTINAL NEGATIVE: 1
SHORTNESS OF BREATH: 0
TROUBLE SWALLOWING: 0
CHOKING: 0
FACIAL SWELLING: 0
VOICE CHANGE: 0
COLOR CHANGE: 0
ANAL BLEEDING: 0
EYES NEGATIVE: 1
ABDOMINAL PAIN: 0
SINUS PRESSURE: 0
ALLERGIC/IMMUNOLOGIC NEGATIVE: 1
CHEST TIGHTNESS: 0
RECTAL PAIN: 0
EYE ITCHING: 0
WHEEZING: 0
STRIDOR: 0
APNEA: 0
CONSTIPATION: 0
BLOOD IN STOOL: 0
EYE REDNESS: 0
PHOTOPHOBIA: 0
RESPIRATORY NEGATIVE: 1
ABDOMINAL DISTENTION: 0
ORTHOPNEA: 0
BACK PAIN: 0
BLURRED VISION: 0
NAUSEA: 0

## 2021-12-17 NOTE — PROGRESS NOTES
Sloan Jeter is a 39 y.o. male. HPI/Chief C/O:  Chief Complaint   Patient presents with    Rash     Pt states he had covid in October and has a rash on his face that has gotten worse since then     No Known Allergies  The patient is here for a medication list and treatment planning review  We will go over our care planning goals as well as take care of all refills  We will set up labs as well   C/O a rash to his face     Hypertension  Associated symptoms include anxiety and malaise/fatigue. Pertinent negatives include no blurred vision, chest pain, headaches, neck pain, orthopnea, palpitations, peripheral edema, PND, shortness of breath or sweats. Risk factors for coronary artery disease include male gender and stress. Past treatments include lifestyle changes. The current treatment provides significant improvement. Compliance problems include diet. There is no history of angina, kidney disease, CAD/MI, CVA, heart failure, left ventricular hypertrophy, PVD or retinopathy. There is no history of chronic renal disease, coarctation of the aorta, hyperaldosteronism, hypercortisolism, hyperparathyroidism, a hypertension causing med, pheochromocytoma, renovascular disease, sleep apnea or a thyroid problem. ROS:  Review of Systems   Constitutional: Positive for malaise/fatigue. Negative for activity change, appetite change, chills, diaphoresis and unexpected weight change. HENT: Negative. Negative for dental problem, drooling, ear discharge, ear pain, facial swelling, hearing loss, mouth sores, nosebleeds, postnasal drip, sinus pressure, sinus pain, sneezing, tinnitus, trouble swallowing and voice change. Eyes: Negative. Negative for blurred vision, photophobia, discharge, redness, itching and visual disturbance. Respiratory: Negative. Negative for apnea, choking, chest tightness, shortness of breath, wheezing and stridor. Cardiovascular: Negative.   Negative for chest pain, palpitations, orthopnea, leg swelling and PND. Gastrointestinal: Negative. Negative for abdominal distention, abdominal pain, anal bleeding, blood in stool, constipation, nausea and rectal pain. Endocrine: Negative. Negative for cold intolerance, heat intolerance, polydipsia, polyphagia and polyuria. Genitourinary: Negative. Negative for decreased urine volume, difficulty urinating, dysuria, enuresis, flank pain, frequency, genital sores, hematuria, penile discharge, penile pain, penile swelling, scrotal swelling, testicular pain and urgency. Musculoskeletal: Negative. Negative for arthralgias, back pain, gait problem, joint swelling, myalgias, neck pain and neck stiffness. Skin: Negative for color change, pallor and wound. Allergic/Immunologic: Negative. Negative for environmental allergies, food allergies and immunocompromised state. Neurological: Negative. Negative for dizziness, tremors, seizures, syncope, facial asymmetry, speech difficulty, weakness, light-headedness, numbness and headaches. Hematological: Negative. Negative for adenopathy. Does not bruise/bleed easily. Psychiatric/Behavioral: Positive for sleep disturbance. Negative for agitation, behavioral problems, confusion, decreased concentration, dysphoric mood, hallucinations, self-injury and suicidal ideas. The patient is nervous/anxious. The patient is not hyperactive. Past Medical/Surgical Hx;  Reviewed with patient      Diagnosis Date    Arthritis     Hx of blood clots      History reviewed. No pertinent surgical history.     Past Family Hx:  Reviewed with patient      Problem Relation Age of Onset    No Known Problems Mother     No Known Problems Father     No Known Problems Sister     Kidney Disease Brother     High Cholesterol Brother        Social Hx:  Reviewed with patient  Social History     Tobacco Use    Smoking status: Never Smoker    Smokeless tobacco: Never Used   Substance Use Topics    Alcohol use: No       OBJECTIVE  /70   Pulse 71   Temp 97.9 °F (36.6 °C)   Resp 16   Ht 6' 4\" (1.93 m)   Wt 251 lb (113.9 kg)   SpO2 97%   BMI 30.55 kg/m²     Problem List:  Doris Everett does not have any pertinent problems on file. PHYS EX:  Physical Exam  Vitals and nursing note reviewed. Constitutional:       General: He is not in acute distress. Appearance: Normal appearance. He is well-developed. He is not ill-appearing, toxic-appearing or diaphoretic. HENT:      Head: Normocephalic and atraumatic. Right Ear: External ear normal. There is no impacted cerumen. Left Ear: External ear normal. There is no impacted cerumen. Nose: Nose normal. No congestion or rhinorrhea. Mouth/Throat:      Mouth: Mucous membranes are moist.      Pharynx: Oropharynx is clear. No oropharyngeal exudate or posterior oropharyngeal erythema. Eyes:      General: No scleral icterus. Right eye: No discharge. Left eye: No discharge. Extraocular Movements: Extraocular movements intact. Conjunctiva/sclera: Conjunctivae normal.      Pupils: Pupils are equal, round, and reactive to light. Neck:      Thyroid: No thyromegaly. Vascular: No carotid bruit. Trachea: No tracheal deviation. Cardiovascular:      Rate and Rhythm: Normal rate and regular rhythm. Pulses: Normal pulses. Heart sounds: Normal heart sounds. No murmur heard. No friction rub. No gallop. Pulmonary:      Effort: Pulmonary effort is normal. No respiratory distress. Breath sounds: Normal breath sounds. No stridor. No wheezing, rhonchi or rales. Chest:      Chest wall: No tenderness. Abdominal:      General: Bowel sounds are normal. There is no distension. Palpations: Abdomen is soft. There is no mass. Tenderness: There is no abdominal tenderness. There is no right CVA tenderness, left CVA tenderness, guarding or rebound. Hernia: No hernia is present.    Genitourinary: Penis: No tenderness. Musculoskeletal:         General: No swelling, tenderness, deformity or signs of injury. Normal range of motion. Cervical back: Normal range of motion and neck supple. No rigidity. No muscular tenderness. Right lower leg: No edema. Left lower leg: No edema. Lymphadenopathy:      Cervical: No cervical adenopathy. Skin:     General: Skin is warm. Coloration: Skin is not jaundiced or pale. Findings: Rash (facial rash ) present. No bruising, erythema or lesion. Neurological:      General: No focal deficit present. Mental Status: He is alert and oriented to person, place, and time. Cranial Nerves: No cranial nerve deficit. Sensory: No sensory deficit. Motor: No weakness or abnormal muscle tone. Coordination: Coordination normal.      Gait: Gait normal.      Deep Tendon Reflexes: Reflexes are normal and symmetric. Reflexes normal.         ASSESSMENT/PLAN  Loreto Matias was seen today for rash. Diagnoses and all orders for this visit:    Rosacea  -     doxycycline hyclate (VIBRA-TABS) 100 MG tablet; Take 1 tablet by mouth 2 times daily for 10 days  -     metroNIDAZOLE (METROGEL) 0.75 % gel; Apply topically 2 times daily. -     Comprehensive Metabolic Panel; Future  -     CBC Auto Differential; Future  Long talk on treatment and prevention  Literature is given       IFG (impaired fasting glucose)  -     Comprehensive Metabolic Panel; Future  -     CBC Auto Differential; Future  -     Hemoglobin A1C; Future    Dyslipidemia  -     Comprehensive Metabolic Panel; Future  -     Lipid Panel; Future  -     CBC Auto Differential; Future  --Mediterranean diet, exercise, weight loss, vitamins    We have a long talk on cholesterol and importance of lowering it       Fatigue, unspecified type  -     TSH without Reflex; Future  -     Uric Acid; Future  -     Comprehensive Metabolic Panel;  Future  -     CBC Auto Differential; Future  Long talk on treatment and prevention  Literature is given           Outpatient Encounter Medications as of 12/17/2021   Medication Sig Dispense Refill    doxycycline hyclate (VIBRA-TABS) 100 MG tablet Take 1 tablet by mouth 2 times daily for 10 days 20 tablet 0    metroNIDAZOLE (METROGEL) 0.75 % gel Apply topically 2 times daily. 1 each 2    clotrimazole-betamethasone (LOTRISONE) 1-0.05 % cream Apply topically 2 times daily. 1 each 3    aspirin 325 MG EC tablet TAKE 1 TABLET BY MOUTH EVERY DAY 90 tablet 1    Ascorbic Acid (VITAMIN C PO) Take by mouth       VITAMIN D PO Take by mouth       budesonide-formoterol (SYMBICORT) 80-4.5 MCG/ACT AERO Inhale 2 puffs into the lungs 2 times daily 10.2 g 3     No facility-administered encounter medications on file as of 12/17/2021. Return in about 6 weeks (around 1/28/2022).         Reviewed recent labs related to Arturo's current problems      Discussed importance of regular Health Maintenance follow up  Health Maintenance   Topic    Hepatitis C screen     Varicella vaccine (1 of 2 - 2-dose childhood series)    COVID-19 Vaccine (1)    HIV screen     Flu vaccine (1)    Lipid screen     DTaP/Tdap/Td vaccine (3 - Td or Tdap)    Hepatitis A vaccine     Hepatitis B vaccine     Hib vaccine     Meningococcal (ACWY) vaccine     Pneumococcal 0-64 years Vaccine

## 2021-12-17 NOTE — PATIENT INSTRUCTIONS
Patient Education        Rosacea: Care Instructions  Your Care Instructions  Rosacea (say \"Damián\") is a skin condition that can cause redness, pimples, and red lines on the nose, cheeks, chin, and forehead. It is often mistaken for acne because it can cause outbreaks with bumps like pimples. Rosacea can also cause burning and soreness in your eyes. Rosacea is usually controlled by using medicine and avoiding alcohol, the sun, and other things that can make rosacea worse. Your doctor may have prescribed medicines or other treatment. If antibiotics do not control the rosacea, your doctor may try other medicines. Follow-up care is a key part of your treatment and safety. Be sure to make and go to all appointments, and call your doctor if you are having problems. It's also a good idea to know your test results and keep a list of the medicines you take. How can you care for yourself at home? · Take your medicines exactly as prescribed. Call your doctor if you think you are having a problem with your medicine. · Protect your face from the sun by wearing hats with wide brims and sunglasses. Try to stay out of the sun or find shade if you need to be outdoors. Use a sunscreen for sensitive skin with an SPF of 30 or higher on any exposed skin. · Use soaps, lotions, and makeup made for sensitive skin or rosacea. These do not contain alcohol, are not abrasive, and will not clog pores. · There are over-the-counter skin care products available that are specifically for people with rosacea. These products can help mask facial redness without irritating your skin. · Avoid rubbing or scrubbing your face. · If you have rosacea on your eyelids, put a warm, wet towel, or compress, on your eyes several times a day. Gently wash your eyelids with a washcloth or an eyelid cleanser that is sold in [facility]. Use artificial tears if your eyes feel dry.   · Make a list or keep a diary of things that may trigger your rosacea. Use the diary every day for several weeks. Avoid whatever you find that makes your rosacea worse. These triggers may include:  ? Harsh weather. Wear a hat and scarf to shield your face from the cold and wind. Use a moisturizer during the winter to keep your face moist.  ? Stress. Eat a healthy diet and get plenty of exercise and sleep. ? Alcohol, spicy foods, or hot drinks. Avoid or limit these if they make your rosacea worse. ? Getting too hot when you exercise. Try working out for a shorter time. In the summer, exercise during the cool morning hours. ? Hot showers. Take warm or cool showers and avoid hot tubs and saunas. When should you call for help? Watch closely for changes in your health, and be sure to contact your doctor if:    · You do not get better as expected. Where can you learn more? Go to https://[email or web address]. org and sign in to your [employer] account. Enter I491 in the [employer] box to learn more about \"Rosacea: Care Instructions. \"     If you do not have an account, please click on the \"Sign Up Now\" link. Current as of: March 3, 2021               Content Version: 13.0  © 7255-9190 [employer]. Care instructions adapted under license by [facility] ([facility]). If you have questions about a medical condition or this instruction, always ask your healthcare professional. [place] [address] any warranty or liability for your use of this information.

## 2022-09-23 DIAGNOSIS — R20.2 PARESTHESIA: Primary | ICD-10-CM

## 2022-11-23 ENCOUNTER — HOSPITAL ENCOUNTER (OUTPATIENT)
Dept: GENERAL RADIOLOGY | Age: 42
Discharge: HOME | End: 2022-11-25
Payer: COMMERCIAL

## 2022-11-23 ENCOUNTER — HOSPITAL ENCOUNTER (OUTPATIENT)
Age: 42
Discharge: HOME | End: 2022-11-25
Payer: COMMERCIAL

## 2022-11-23 ENCOUNTER — TELEPHONE (OUTPATIENT)
Dept: PHYSICAL MEDICINE AND REHAB | Age: 42
End: 2022-11-23

## 2022-11-23 DIAGNOSIS — M25.551 RIGHT HIP PAIN: ICD-10-CM

## 2022-11-23 PROCEDURE — 73502 X-RAY EXAM HIP UNI 2-3 VIEWS: CPT

## 2022-11-23 NOTE — TELEPHONE ENCOUNTER
Patient no showed appointment for EMG in 9/2022- then did a same day cancel today for emg 11/23/22. Wife is calling now to reschedule the emg. Please advise if ok to reschedule.

## 2023-06-07 ENCOUNTER — TRANSCRIBE ORDERS (OUTPATIENT)
Dept: ADMINISTRATIVE | Age: 43
End: 2023-06-07

## 2023-06-07 DIAGNOSIS — M79.604 RIGHT LEG PAIN: Primary | ICD-10-CM

## 2023-06-08 ENCOUNTER — HOSPITAL ENCOUNTER (OUTPATIENT)
Age: 43
Discharge: HOME | End: 2023-06-08
Payer: COMMERCIAL

## 2023-06-08 ENCOUNTER — HOSPITAL ENCOUNTER (OUTPATIENT)
Dept: ULTRASOUND IMAGING | Age: 43
Discharge: HOME | End: 2023-06-08
Payer: COMMERCIAL

## 2023-06-08 DIAGNOSIS — M79.604 RIGHT LEG PAIN: ICD-10-CM

## 2023-06-08 PROCEDURE — 93971 EXTREMITY STUDY: CPT

## 2024-05-25 ENCOUNTER — HOSPITAL ENCOUNTER (EMERGENCY)
Age: 44
Discharge: HOME | End: 2024-05-25
Attending: EMERGENCY MEDICINE
Payer: COMMERCIAL

## 2024-05-25 VITALS
BODY MASS INDEX: 30.43 KG/M2 | HEART RATE: 72 BPM | OXYGEN SATURATION: 99 % | TEMPERATURE: 98.5 F | DIASTOLIC BLOOD PRESSURE: 66 MMHG | WEIGHT: 250 LBS | RESPIRATION RATE: 18 BRPM | SYSTOLIC BLOOD PRESSURE: 146 MMHG

## 2024-05-25 DIAGNOSIS — S81.011A LACERATION OF RIGHT KNEE, INITIAL ENCOUNTER: Primary | ICD-10-CM

## 2024-05-25 PROCEDURE — 2500000003 HC RX 250 WO HCPCS: Performed by: STUDENT IN AN ORGANIZED HEALTH CARE EDUCATION/TRAINING PROGRAM

## 2024-05-25 PROCEDURE — 12001 RPR S/N/AX/GEN/TRNK 2.5CM/<: CPT

## 2024-05-25 PROCEDURE — 6370000000 HC RX 637 (ALT 250 FOR IP): Performed by: STUDENT IN AN ORGANIZED HEALTH CARE EDUCATION/TRAINING PROGRAM

## 2024-05-25 PROCEDURE — 99284 EMERGENCY DEPT VISIT MOD MDM: CPT

## 2024-05-25 PROCEDURE — 90714 TD VACC NO PRESV 7 YRS+ IM: CPT | Performed by: STUDENT IN AN ORGANIZED HEALTH CARE EDUCATION/TRAINING PROGRAM

## 2024-05-25 PROCEDURE — 90471 IMMUNIZATION ADMIN: CPT | Performed by: STUDENT IN AN ORGANIZED HEALTH CARE EDUCATION/TRAINING PROGRAM

## 2024-05-25 PROCEDURE — 6360000002 HC RX W HCPCS: Performed by: STUDENT IN AN ORGANIZED HEALTH CARE EDUCATION/TRAINING PROGRAM

## 2024-05-25 RX ORDER — CEPHALEXIN 500 MG/1
500 CAPSULE ORAL 3 TIMES DAILY
Qty: 15 CAPSULE | Refills: 0 | Status: SHIPPED | OUTPATIENT
Start: 2024-05-25 | End: 2024-05-30

## 2024-05-25 RX ORDER — BACITRACIN ZINC 500 [USP'U]/G
OINTMENT TOPICAL ONCE
Status: COMPLETED | OUTPATIENT
Start: 2024-05-25 | End: 2024-05-25

## 2024-05-25 RX ORDER — CEPHALEXIN 500 MG/1
500 CAPSULE ORAL 3 TIMES DAILY
Qty: 15 CAPSULE | Refills: 0 | Status: SHIPPED | OUTPATIENT
Start: 2024-05-25 | End: 2024-05-25

## 2024-05-25 RX ORDER — LIDOCAINE HYDROCHLORIDE 10 MG/ML
5 INJECTION, SOLUTION INFILTRATION; PERINEURAL ONCE
Status: COMPLETED | OUTPATIENT
Start: 2024-05-25 | End: 2024-05-25

## 2024-05-25 RX ADMIN — CLOSTRIDIUM TETANI TOXOID ANTIGEN (FORMALDEHYDE INACTIVATED) AND CORYNEBACTERIUM DIPHTHERIAE TOXOID ANTIGEN (FORMALDEHYDE INACTIVATED) 0.5 ML: 5; 2 INJECTION, SUSPENSION INTRAMUSCULAR at 15:36

## 2024-05-25 RX ADMIN — BACITRACIN 1 EACH: 500 OINTMENT TOPICAL at 15:38

## 2024-05-25 RX ADMIN — LIDOCAINE HYDROCHLORIDE 5 ML: 10 INJECTION, SOLUTION INFILTRATION; PERINEURAL at 15:37

## 2024-05-25 ASSESSMENT — PAIN SCALES - GENERAL
PAINLEVEL_OUTOF10: 4
PAINLEVEL_OUTOF10: 10

## 2024-05-25 ASSESSMENT — PAIN DESCRIPTION - ORIENTATION: ORIENTATION: RIGHT

## 2024-05-25 ASSESSMENT — PAIN - FUNCTIONAL ASSESSMENT: PAIN_FUNCTIONAL_ASSESSMENT: 0-10

## 2024-05-25 ASSESSMENT — PAIN DESCRIPTION - LOCATION: LOCATION: KNEE

## 2024-05-25 NOTE — DISCHARGE INSTRUCTIONS
Follow-up with your PCP for suture removal in 10 to 14 days.  Return for signs concerning for infection including increased swelling, redness, fevers, chills, inability to bend the knee.

## 2024-05-25 NOTE — ED PROVIDER NOTES
[facility] EMERGENCY DEPARTMENT  EMERGENCY DEPARTMENT ENCOUNTER        Pt Name: Arturo Mcgrath  MRN: [id number]  Birthdate 1980  Date of evaluation: 5/25/2024  Provider: Juan Castaneda DO  PCP: Kiah Balderas APRN - CNP  Note Started: 3:29 PM EDT 5/25/24    CHIEF COMPLAINT       Chief Complaint   Patient presents with    Laceration     Pt c/o laceration to right knee that occurred around 1430 while running to avoid the rain. Pt states he struck his knee on a metal plate.        HISTORY OF PRESENT ILLNESS: 1 or more Elements   History From: Patient    Limitations to history : None    Arturo Mcgrath is a 43 y.o. male who presents to the emergency department for complaint of a laceration to the right knee that occurred around 1430.  Patient states he was raining outside and he struck his knee on a metal plate that was holding firewood.  Denies any other injuries.  Denies any associated numbness or tingling.  He is able to ambulate without difficulty as well as bend his knee.  He reports mild bleeding.  He states his last tetanus shot was around 5 years ago.    Nursing Notes were all reviewed and agreed with or any disagreements were addressed in the HPI.    REVIEW OF SYSTEMS :           Positives and Pertinent negatives as per HPI.     SURGICAL HISTORY   No past surgical history on file.    CURRENTMEDICATIONS       Previous Medications    ASCORBIC ACID (VITAMIN C PO)    Take by mouth     ASPIRIN 325 MG EC TABLET    TAKE 1 TABLET BY MOUTH EVERY DAY    BUDESONIDE-FORMOTEROL (SYMBICORT) 80-4.5 MCG/ACT AERO    Inhale 2 puffs into the lungs 2 times daily    CLOTRIMAZOLE-BETAMETHASONE (LOTRISONE) 1-0.05 % CREAM    Apply topically 2 times daily.    METRONIDAZOLE (METROGEL) 0.75 % GEL    Apply topically 2 times daily.    VITAMIN D PO    Take by mouth        ALLERGIES     Patient has no known allergies.    FAMILYHISTORY       Family History   Problem Relation Age of Onset    No  medical decision making, and procedures and agree with all pertinent clinical information unless otherwise noted.      I have also reviewed and agree with the past medical, family and social history unless otherwise noted.    I have discussed this patient in detail with the resident, and provided the instruction and education regarding patient here for laceration to the right knee, stepped and hit a piece of metal with it prior to ER arrival.  Last tetanus shot little over 5 years ago he states.  Complains of laceration to the lateral aspect of the right knee.  No known foreign bodies and no extremity numbness, tingling, paresthesia or weakness..  My findings/plan: Patient with a 4 to 5 cm linear laceration right knee lateral aspect into the fat layer just through the skin layer does not appear to be deep or penetrating or joint.  No foreign bodies and no tendon or vascular involvement, bleeding controlled already.  He is neurovascular intact distally.  No particular patella or bony tenderness throughout the right knee which has a full range of motion and no tenderness.  Musculoskeletal exam otherwise unremarkable.  Heart rate regular, lungs are clear and equal.       [NC]      ED Course User Index  [NC] Forrest Pabon, DO        Medical Decision Making  Risk  OTC drugs.  Prescription drug management.        Patient is a 43 y.o. male who presents for complaint of a laceration to the right knee.    Differential includes, but not limited to laceration, contusion, acute fracture.  Patient with reassuring physical exam, neurovascularly intact with full range of motion, no knee swelling noted.  I have low suspicion for invasion of the joint capsule given the superficial nature of the wound.  Wound was copiously irrigated and repaired with sutures.  Patient started on prophylactic Keflex and advised to follow-up with his PCP in 10 to 14 days for suture removal and wound check.    Treatment includes tetanus was  updated    CONSULTS:   None        I am the Primary Clinician of Record.    FINAL IMPRESSION      1. Laceration of right knee, initial encounter          DISPOSITION/PLAN     DISPOSITION Decision To Discharge 05/25/2024 03:55:28 PM      PATIENT REFERRED TO:  Kiah Balderas APRN - CNP  [address]  [facility] [address]  [phone]    Schedule an appointment as soon as possible for a visit in 10 days  For follow-up, For suture removal, For wound re-check      DISCHARGE MEDICATIONS:  New Prescriptions    CEPHALEXIN (KEFLEX) 500 MG CAPSULE    Take 1 capsule by mouth 3 times daily for 5 days       DISCONTINUED MEDICATIONS:  Discontinued Medications    No medications on file              (Please note that portions of this note were completed with a voice recognition program.  Efforts were made to edit the dictations but occasionally words are mis-transcribed.)    Juan Castaneda DO (electronically signed)

## 2024-11-02 ENCOUNTER — HOSPITAL ENCOUNTER (EMERGENCY)
Age: 44
Discharge: HOME | End: 2024-11-02
Attending: EMERGENCY MEDICINE
Payer: COMMERCIAL

## 2024-11-02 ENCOUNTER — APPOINTMENT (OUTPATIENT)
Dept: CT IMAGING | Age: 44
End: 2024-11-02
Payer: COMMERCIAL

## 2024-11-02 VITALS
DIASTOLIC BLOOD PRESSURE: 79 MMHG | SYSTOLIC BLOOD PRESSURE: 122 MMHG | HEART RATE: 81 BPM | RESPIRATION RATE: 18 BRPM | TEMPERATURE: 97 F | OXYGEN SATURATION: 100 %

## 2024-11-02 DIAGNOSIS — R42 DIZZINESS: Primary | ICD-10-CM

## 2024-11-02 LAB
ANION GAP SERPL CALCULATED.3IONS-SCNC: 9 MMOL/L (ref 7–16)
BASOPHILS # BLD: 0.03 K/UL (ref 0–0.2)
BASOPHILS NFR BLD: 0 % (ref 0–2)
BUN SERPL-MCNC: 14 MG/DL (ref 6–20)
CALCIUM SERPL-MCNC: 9 MG/DL (ref 8.6–10.2)
CHLORIDE SERPL-SCNC: 106 MMOL/L (ref 98–107)
CO2 SERPL-SCNC: 24 MMOL/L (ref 22–29)
CREAT SERPL-MCNC: 1.1 MG/DL (ref 0.7–1.2)
EOSINOPHIL # BLD: 0.5 K/UL (ref 0.05–0.5)
EOSINOPHILS RELATIVE PERCENT: 6 % (ref 0–6)
ERYTHROCYTE [DISTWIDTH] IN BLOOD BY AUTOMATED COUNT: 12.6 % (ref 11.5–15)
GFR, ESTIMATED: 83 ML/MIN/1.73M2
GLUCOSE SERPL-MCNC: 88 MG/DL (ref 74–99)
HCT VFR BLD AUTO: 46.6 % (ref 37–54)
HGB BLD-MCNC: 15.7 G/DL (ref 12.5–16.5)
IMM GRANULOCYTES # BLD AUTO: <0.03 K/UL (ref 0–0.58)
IMM GRANULOCYTES NFR BLD: 0 % (ref 0–5)
LYMPHOCYTES NFR BLD: 3.3 K/UL (ref 1.5–4)
LYMPHOCYTES RELATIVE PERCENT: 37 % (ref 20–42)
MCH RBC QN AUTO: 28.1 PG (ref 26–35)
MCHC RBC AUTO-ENTMCNC: 33.7 G/DL (ref 32–34.5)
MCV RBC AUTO: 83.5 FL (ref 80–99.9)
MONOCYTES NFR BLD: 0.8 K/UL (ref 0.1–0.95)
MONOCYTES NFR BLD: 9 % (ref 2–12)
NEUTROPHILS NFR BLD: 48 % (ref 43–80)
NEUTS SEG NFR BLD: 4.3 K/UL (ref 1.8–7.3)
PLATELET # BLD AUTO: 199 K/UL (ref 130–450)
PMV BLD AUTO: 9.1 FL (ref 7–12)
POTASSIUM SERPL-SCNC: 4.8 MMOL/L (ref 3.5–5)
RBC # BLD AUTO: 5.58 M/UL (ref 3.8–5.8)
SODIUM SERPL-SCNC: 139 MMOL/L (ref 132–146)
TROPONIN I SERPL HS-MCNC: <6 NG/L (ref 0–11)
WBC OTHER # BLD: 9 K/UL (ref 4.5–11.5)

## 2024-11-02 PROCEDURE — 99284 EMERGENCY DEPT VISIT MOD MDM: CPT

## 2024-11-02 PROCEDURE — 6370000000 HC RX 637 (ALT 250 FOR IP): Performed by: EMERGENCY MEDICINE

## 2024-11-02 PROCEDURE — 93005 ELECTROCARDIOGRAM TRACING: CPT | Performed by: PHYSICIAN ASSISTANT

## 2024-11-02 PROCEDURE — 84484 ASSAY OF TROPONIN QUANT: CPT

## 2024-11-02 PROCEDURE — 80048 BASIC METABOLIC PNL TOTAL CA: CPT

## 2024-11-02 PROCEDURE — 70450 CT HEAD/BRAIN W/O DYE: CPT

## 2024-11-02 PROCEDURE — 85025 COMPLETE CBC W/AUTO DIFF WBC: CPT

## 2024-11-02 RX ORDER — MECLIZINE HCL 12.5 MG 12.5 MG/1
37.5 TABLET ORAL ONCE
Status: COMPLETED | OUTPATIENT
Start: 2024-11-02 | End: 2024-11-02

## 2024-11-02 RX ORDER — ESCITALOPRAM OXALATE 5 MG/1
5 TABLET ORAL DAILY
COMMUNITY

## 2024-11-02 RX ADMIN — MECLIZINE HYDROCHLORIDE 37.5 MG: 12.5 TABLET ORAL at 15:42

## 2024-11-02 ASSESSMENT — LIFESTYLE VARIABLES
HOW OFTEN DO YOU HAVE A DRINK CONTAINING ALCOHOL: NEVER
HOW MANY STANDARD DRINKS CONTAINING ALCOHOL DO YOU HAVE ON A TYPICAL DAY: PATIENT DOES NOT DRINK

## 2024-11-02 NOTE — ED PROVIDER NOTES
[facility] EMERGENCY DEPARTMENT  EMERGENCY DEPARTMENT ENCOUNTER        Pt Name: Arturo Mcgrath  MRN: [id number]  Birthdate 1980  Date of evaluation: 11/2/2024  Provider: Abelino Lea DO  PCP: No primary care provider on file.  Note Started: 2:36 PM EDT 11/2/24    CHIEF COMPLAINT       Chief Complaint   Patient presents with    Dizziness     For over a month - increasing over the last week. Complaints of blurred vision, fatigue, headache.        HISTORY OF PRESENT ILLNESS: 1 or more Elements   History From: patient    Limitations to history : None    Arturo Mcgrath is a 44 y.o. male who presents to the ED for evaluation of dizziness.  Patient states he has been experiencing this over the past month.  This week it has gotten worse.  He has a hard time describing the dizziness.  States it feels kind of like lightheadedness.  Slightly worse with standing.  Not always associate with nausea.  Patient reports history of vertigo but states this feels different  Denies fever or chills.  No chest pain or shortness of breath.  Has had no falls or head injuries as a result of the dizziness.  No difficulty ambulating.  Patient also has occasional blurred vision and fatigue.  Mild headache denies neck pain or stiffness.  Patient states that he was placed on escitalopram approximately 3 months ago.  Patient has history of mild depression.    Nursing Notes were all reviewed and agreed with or any disagreements were addressed in the HPI.      REVIEW OF EXTERNAL NOTE :        REVIEW OF SYSTEMS :           Positives and Pertinent negatives as per HPI.     SURGICAL HISTORY   History reviewed. No pertinent surgical history.    CURRENTMEDICATIONS       Previous Medications    ASCORBIC ACID (VITAMIN C PO)    Take by mouth     ASPIRIN 325 MG EC TABLET    TAKE 1 TABLET BY MOUTH EVERY DAY    BUDESONIDE-FORMOTEROL (SYMBICORT) 80-4.5 MCG/ACT AERO    Inhale 2 puffs into the lungs 2 times  ordered.  Radiology: ordered.        Patient presents to the ED for evaluation of dizziness.  Patient states he has been experiencing this over the past month.  This week it has gotten worse.  He has a hard time describing the dizziness.  States it feels kind of like lightheadedness.  Slightly worse with standing.  Not always associate with nausea.  Patient reports history of vertigo but states this feels different  Denies fever or chills. Differential diagnoses included but not limited to intracranial hemorrhage, vertigo, orthostasis, dehydration, electrolyte derangement, cardiac dysrhythmia. Workup in the ED consisted appropriate labs and imaging.  The following labs were evaluated interpreted by myself.  CBC shows no evidence of leukocytosis or anemia.  BMP shows no electrolyte abnormalities or evidence of renal insufficiency.  Patient's troponin was less than 6.  CT of the head interpreted by the radiologist as showing no acute intracranial process. Patient was given meclizine for their symptoms with mild improvement. Patient continues to be non-toxic on re-evaluation. Findings were discussed with the patient and reasons to immediately return to the ED were articulated to them. They will follow-up with their PMD.      CONSULTS: (Who and What was discussed)  None        I am the Primary Clinician of Record.    FINAL IMPRESSION      1. Dizziness          DISPOSITION/PLAN     DISPOSITION Decision To Discharge 11/02/2024 04:49:07 PM      PATIENT REFERRED TO:  Lou Walker MD  [address] [facility] [address] [phone]    Call   If symptoms worsen return      DISCHARGE MEDICATIONS:  New Prescriptions    No medications on file       DISCONTINUED MEDICATIONS:  Discontinued Medications    No medications on file              (Please note that portions of this note were completed with a voice recognition program.  Efforts were made to edit the dictations but occasionally words are

## 2024-11-03 LAB
EKG ATRIAL RATE: 81 BPM
EKG P AXIS: 70 DEGREES
EKG P-R INTERVAL: 168 MS
EKG Q-T INTERVAL: 362 MS
EKG QRS DURATION: 76 MS
EKG QTC CALCULATION (BAZETT): 420 MS
EKG R AXIS: 8 DEGREES
EKG T AXIS: 49 DEGREES
EKG VENTRICULAR RATE: 81 BPM

## 2024-11-03 PROCEDURE — 93010 ELECTROCARDIOGRAM REPORT: CPT | Performed by: INTERNAL MEDICINE
